# Patient Record
Sex: MALE | Race: WHITE | NOT HISPANIC OR LATINO | Employment: UNEMPLOYED | URBAN - METROPOLITAN AREA
[De-identification: names, ages, dates, MRNs, and addresses within clinical notes are randomized per-mention and may not be internally consistent; named-entity substitution may affect disease eponyms.]

---

## 2017-08-03 ENCOUNTER — APPOINTMENT (OUTPATIENT)
Dept: LAB | Facility: MEDICAL CENTER | Age: 62
End: 2017-08-03

## 2017-08-04 ENCOUNTER — HOSPITAL ENCOUNTER (OUTPATIENT)
Dept: LAB | Facility: MEDICAL CENTER | Age: 62
End: 2017-08-04
Attending: OTOLARYNGOLOGY
Payer: COMMERCIAL

## 2017-08-04 PROCEDURE — 88271 CYTOGENETICS DNA PROBE: CPT | Mod: 91

## 2017-08-04 PROCEDURE — 88184 FLOWCYTOMETRY/ TC 1 MARKER: CPT

## 2017-08-04 PROCEDURE — 81263 IGH VARI REGIONAL MUTATION: CPT

## 2017-08-04 PROCEDURE — 88185 FLOWCYTOMETRY/TC ADD-ON: CPT | Mod: 91

## 2017-08-04 PROCEDURE — 36415 COLL VENOUS BLD VENIPUNCTURE: CPT

## 2017-08-04 PROCEDURE — 369999 HCHG MISC LAB CHARGE

## 2017-08-04 PROCEDURE — 88291 CYTO/MOLECULAR REPORT: CPT

## 2017-08-04 PROCEDURE — 88275 CYTOGENETICS 100-300: CPT | Mod: 91

## 2017-08-05 LAB
ACUTE LEUKEMIA MARKERS SPEC-IMP: NORMAL
CD10 CELLS NFR SPEC: <1 %
CD13 CELLS NFR SPEC: <1 %
CD16 CELLS NFR SPEC: 1 %
CD19 CELLS NFR SPEC: 88 %
CD2 CELLS NFR SPEC: 8 %
CD20 CELLS NFR SPEC: 92 %
CD200 LEUKLYMP PHENO Q5833: 5 %
CD23 CELLS NFR SPEC: 31 %
CD3 CELLS NFR SPEC: 6 %
CD34 CELLS NFR SPEC: <1 %
CD38 CELLS NFR SPEC: <1 %
CD4 LEUKLYMP PHENO Q5783: 2 %
CD45 CELLS NFR SPEC: NORMAL %
CD5 CELLS NFR SPEC: 96 %
CD7 CELLS NFR SPEC: 5 %
CD8 LEUKLYMP PHENO Q5786: 3 %
EVENTS COUNTED SPEC: 18 MARKERS
LYMPHS KAPPA/LYMPH NFR SPEC: 86 %
LYMPHS LAMBDA/LYMPH NFR SPEC: 1 %
SOURCE 9121: NORMAL

## 2017-08-10 LAB — TEST NAME 95000: NORMAL

## 2017-08-11 LAB — TEST NAME 95000: NORMAL

## 2017-08-21 ENCOUNTER — HOSPITAL ENCOUNTER (OUTPATIENT)
Dept: LAB | Facility: MEDICAL CENTER | Age: 62
End: 2017-08-21
Attending: OTOLARYNGOLOGY
Payer: COMMERCIAL

## 2017-08-21 PROCEDURE — 88185 FLOWCYTOMETRY/TC ADD-ON: CPT

## 2017-08-21 PROCEDURE — 88184 FLOWCYTOMETRY/ TC 1 MARKER: CPT

## 2017-08-21 PROCEDURE — 36415 COLL VENOUS BLD VENIPUNCTURE: CPT

## 2017-08-23 ENCOUNTER — HOSPITAL ENCOUNTER (OUTPATIENT)
Dept: LAB | Facility: MEDICAL CENTER | Age: 62
End: 2017-08-23
Attending: INTERNAL MEDICINE

## 2017-08-23 PROCEDURE — 36415 COLL VENOUS BLD VENIPUNCTURE: CPT

## 2017-08-23 PROCEDURE — 88275 CYTOGENETICS 100-300: CPT | Mod: 91

## 2017-08-23 PROCEDURE — 88271 CYTOGENETICS DNA PROBE: CPT

## 2017-08-23 PROCEDURE — 88291 CYTO/MOLECULAR REPORT: CPT

## 2017-08-25 LAB — TEST NAME 95000: NORMAL

## 2017-09-01 LAB — TEST NAME 95000: NORMAL

## 2017-09-07 ENCOUNTER — TELEPHONE (OUTPATIENT)
Dept: HEMATOLOGY ONCOLOGY | Facility: MEDICAL CENTER | Age: 62
End: 2017-09-07

## 2017-09-07 NOTE — TELEPHONE ENCOUNTER
Spoke to Ana, patient's son, per him his [facility] provider has been in contact with Dr. Garzon and they were told to contact us to schedule an appointment.  His [facility] provider is Dr. Herrera, we do have labs in media from [facility].  Pt is here on a 3 mo visa which expires approx end of October, so needs to be seen ASAP.  Will reach out to [facility] and verify if I can overbook the pt.

## 2017-09-11 NOTE — TELEPHONE ENCOUNTER
Called and spoke to Ana and scheduled an appointment to see Dr. Garzon.  Received additional records and labs, scanned into media

## 2017-09-11 NOTE — TELEPHONE ENCOUNTER
Flor from Dr Mackey's office at [facility] called- Dr. Mackey had spoken to [facility] concerning an ASAP appt.  Pt may need an infusion.

## 2017-09-12 ENCOUNTER — HOSPITAL ENCOUNTER (OUTPATIENT)
Facility: MEDICAL CENTER | Age: 62
End: 2017-09-12
Attending: INTERNAL MEDICINE
Payer: COMMERCIAL

## 2017-09-12 ENCOUNTER — TELEPHONE (OUTPATIENT)
Dept: HEMATOLOGY ONCOLOGY | Facility: MEDICAL CENTER | Age: 62
End: 2017-09-12

## 2017-09-12 ENCOUNTER — NON-PROVIDER VISIT (OUTPATIENT)
Dept: HEMATOLOGY ONCOLOGY | Facility: MEDICAL CENTER | Age: 62
End: 2017-09-12

## 2017-09-12 ENCOUNTER — OFFICE VISIT (OUTPATIENT)
Dept: HEMATOLOGY ONCOLOGY | Facility: MEDICAL CENTER | Age: 62
End: 2017-09-12

## 2017-09-12 VITALS
RESPIRATION RATE: 16 BRPM | WEIGHT: 160 LBS | SYSTOLIC BLOOD PRESSURE: 120 MMHG | HEIGHT: 65 IN | BODY MASS INDEX: 26.66 KG/M2 | OXYGEN SATURATION: 96 % | TEMPERATURE: 97.7 F | DIASTOLIC BLOOD PRESSURE: 72 MMHG | HEART RATE: 93 BPM

## 2017-09-12 VITALS
BODY MASS INDEX: 26.73 KG/M2 | DIASTOLIC BLOOD PRESSURE: 72 MMHG | WEIGHT: 160.4 LBS | HEIGHT: 65 IN | HEART RATE: 93 BPM | OXYGEN SATURATION: 96 % | TEMPERATURE: 97.7 F | SYSTOLIC BLOOD PRESSURE: 120 MMHG

## 2017-09-12 DIAGNOSIS — C91.10 CLL (CHRONIC LYMPHOCYTIC LEUKEMIA) (HCC): Primary | ICD-10-CM

## 2017-09-12 DIAGNOSIS — C91.10 CLL (CHRONIC LYMPHOCYTIC LEUKEMIA) (HCC): ICD-10-CM

## 2017-09-12 LAB
ALBUMIN SERPL BCP-MCNC: 4.6 G/DL (ref 3.2–4.9)
ALBUMIN/GLOB SERPL: 1.4 G/DL
ALP SERPL-CCNC: 311 U/L (ref 30–99)
ALT SERPL-CCNC: 16 U/L (ref 2–50)
ANION GAP SERPL CALC-SCNC: 7 MMOL/L (ref 0–11.9)
ANISOCYTOSIS BLD QL SMEAR: NORMAL
AST SERPL-CCNC: 27 U/L (ref 12–45)
BASOPHILS # BLD AUTO: 0 % (ref 0–1.8)
BASOPHILS # BLD: 0 K/UL (ref 0–0.12)
BILIRUB SERPL-MCNC: 0.4 MG/DL (ref 0.1–1.5)
BUN SERPL-MCNC: 22 MG/DL (ref 8–22)
CALCIUM SERPL-MCNC: 9.9 MG/DL (ref 8.5–10.5)
CHLORIDE SERPL-SCNC: 100 MMOL/L (ref 96–112)
CO2 SERPL-SCNC: 25 MMOL/L (ref 20–33)
CREAT SERPL-MCNC: 0.96 MG/DL (ref 0.5–1.4)
EOSINOPHIL # BLD AUTO: 0 K/UL (ref 0–0.51)
EOSINOPHIL NFR BLD: 0 % (ref 0–6.9)
ERYTHROCYTE [DISTWIDTH] IN BLOOD BY AUTOMATED COUNT: 55.5 FL (ref 35.9–50)
FERRITIN SERPL-MCNC: 112.6 NG/ML (ref 22–322)
FOLATE SERPL-MCNC: 16.3 NG/ML
GFR SERPL CREATININE-BSD FRML MDRD: >60 ML/MIN/1.73 M 2
GLOBULIN SER CALC-MCNC: 3.3 G/DL (ref 1.9–3.5)
GLUCOSE SERPL-MCNC: 149 MG/DL (ref 65–99)
HCT VFR BLD AUTO: 38.6 % (ref 42–52)
HGB BLD-MCNC: 10.4 G/DL (ref 14–18)
IRON SATN MFR SERPL: 12 % (ref 15–55)
IRON SERPL-MCNC: 42 UG/DL (ref 50–180)
LDH SERPL-CCNC: 378 U/L (ref 107–266)
LYMPHOCYTES # BLD AUTO: 394.72 K/UL (ref 1–4.8)
LYMPHOCYTES NFR BLD: 89 % (ref 22–41)
MANUAL DIFF BLD: ABNORMAL
MCH RBC QN AUTO: 26.9 PG (ref 27–33)
MCHC RBC AUTO-ENTMCNC: 26.9 G/DL (ref 33.7–35.3)
MCV RBC AUTO: 99.7 FL (ref 81.4–97.8)
MONOCYTES # BLD AUTO: 0 K/UL (ref 0–0.85)
MONOCYTES NFR BLD AUTO: 0 % (ref 0–13.4)
NEUTROPHILS # BLD AUTO: 0 K/UL (ref 1.82–7.42)
NEUTROPHILS NFR BLD: 0 % (ref 44–72)
NEUTS BAND NFR BLD MANUAL: 1 % (ref 0–10)
PATH REV: NORMAL
PATH REV: NORMAL
PLATELET # BLD AUTO: 117 K/UL (ref 164–446)
PLATELET BLD QL SMEAR: NORMAL
PMV BLD AUTO: 9.4 FL (ref 9–12.9)
POTASSIUM SERPL-SCNC: 5.9 MMOL/L (ref 3.6–5.5)
PROT SERPL-MCNC: 7.9 G/DL (ref 6–8.2)
RBC # BLD AUTO: 3.87 M/UL (ref 4.7–6.1)
RBC BLD AUTO: PRESENT
SMUDGE CELLS BLD QL SMEAR: NORMAL
SODIUM SERPL-SCNC: 132 MMOL/L (ref 135–145)
TIBC SERPL-MCNC: 343 UG/DL (ref 250–450)
URATE SERPL-MCNC: 6 MG/DL (ref 2.5–8.3)
VIT B12 SERPL-MCNC: 442 PG/ML (ref 211–911)
WBC # BLD AUTO: >440 K/UL (ref 4.8–10.8)
WBC OTHER NFR BLD MANUAL: 10 %

## 2017-09-12 PROCEDURE — 99205 OFFICE O/P NEW HI 60 MIN: CPT | Performed by: INTERNAL MEDICINE

## 2017-09-12 PROCEDURE — 82607 VITAMIN B-12: CPT

## 2017-09-12 PROCEDURE — 82232 ASSAY OF BETA-2 PROTEIN: CPT

## 2017-09-12 PROCEDURE — 83550 IRON BINDING TEST: CPT

## 2017-09-12 PROCEDURE — 83540 ASSAY OF IRON: CPT

## 2017-09-12 PROCEDURE — 85027 COMPLETE CBC AUTOMATED: CPT

## 2017-09-12 PROCEDURE — 85007 BL SMEAR W/DIFF WBC COUNT: CPT

## 2017-09-12 PROCEDURE — 80500 HCHG CLINICAL PATH CONSULT-LIMITED: CPT

## 2017-09-12 PROCEDURE — 83615 LACTATE (LD) (LDH) ENZYME: CPT

## 2017-09-12 PROCEDURE — 82728 ASSAY OF FERRITIN: CPT

## 2017-09-12 PROCEDURE — 82746 ASSAY OF FOLIC ACID SERUM: CPT

## 2017-09-12 PROCEDURE — 84550 ASSAY OF BLOOD/URIC ACID: CPT

## 2017-09-12 PROCEDURE — 80053 COMPREHEN METABOLIC PANEL: CPT

## 2017-09-12 RX ORDER — SODIUM CHLORIDE 9 MG/ML
INJECTION, SOLUTION INTRAVENOUS CONTINUOUS
Status: CANCELLED | OUTPATIENT
Start: 2017-09-22

## 2017-09-12 RX ORDER — LIDOCAINE HYDROCHLORIDE 10 MG/ML
0.5 INJECTION, SOLUTION INFILTRATION; PERINEURAL SEE ADMIN INSTRUCTIONS
Status: CANCELLED | OUTPATIENT
Start: 2017-10-10

## 2017-09-12 RX ORDER — ONDANSETRON 2 MG/ML
4 INJECTION INTRAMUSCULAR; INTRAVENOUS
Status: CANCELLED | OUTPATIENT
Start: 2017-09-22

## 2017-09-12 RX ORDER — SODIUM CHLORIDE 9 MG/ML
1000 INJECTION, SOLUTION INTRAVENOUS CONTINUOUS
Status: CANCELLED | OUTPATIENT
Start: 2017-09-22

## 2017-09-12 RX ORDER — ONDANSETRON 8 MG/1
8 TABLET, ORALLY DISINTEGRATING ORAL
Status: CANCELLED | OUTPATIENT
Start: 2017-10-10

## 2017-09-12 RX ORDER — PROCHLORPERAZINE MALEATE 10 MG
10 TABLET ORAL EVERY 6 HOURS PRN
Status: CANCELLED | OUTPATIENT
Start: 2017-09-29

## 2017-09-12 RX ORDER — ONDANSETRON 8 MG/1
8 TABLET, ORALLY DISINTEGRATING ORAL
Status: CANCELLED | OUTPATIENT
Start: 2017-09-23

## 2017-09-12 RX ORDER — ONDANSETRON 8 MG/1
8 TABLET, ORALLY DISINTEGRATING ORAL
Status: CANCELLED | OUTPATIENT
Start: 2017-09-22

## 2017-09-12 RX ORDER — ONDANSETRON 2 MG/ML
4 INJECTION INTRAMUSCULAR; INTRAVENOUS
Status: CANCELLED | OUTPATIENT
Start: 2017-10-10

## 2017-09-12 RX ORDER — ALLOPURINOL 100 MG/1
100 TABLET ORAL 2 TIMES DAILY
Qty: 60 TAB | Refills: 0 | Status: SHIPPED | OUTPATIENT
Start: 2017-09-12 | End: 2017-09-12 | Stop reason: SDUPTHER

## 2017-09-12 RX ORDER — ALLOPURINOL 100 MG/1
300 TABLET ORAL 2 TIMES DAILY
Qty: 30 TAB | Refills: 0
Start: 2017-09-12 | End: 2017-10-12

## 2017-09-12 RX ORDER — ACETAMINOPHEN 500 MG
1000 TABLET ORAL ONCE
Status: CANCELLED | OUTPATIENT
Start: 2017-09-29 | End: 2017-09-29

## 2017-09-12 RX ORDER — MEPERIDINE HYDROCHLORIDE 25 MG/ML
12.5 INJECTION INTRAMUSCULAR; INTRAVENOUS; SUBCUTANEOUS PRN
Status: CANCELLED | OUTPATIENT
Start: 2017-09-23

## 2017-09-12 RX ORDER — ONDANSETRON 8 MG/1
8 TABLET, ORALLY DISINTEGRATING ORAL
Status: CANCELLED | OUTPATIENT
Start: 2017-09-29

## 2017-09-12 RX ORDER — ONDANSETRON 2 MG/ML
4 INJECTION INTRAMUSCULAR; INTRAVENOUS
Status: CANCELLED | OUTPATIENT
Start: 2017-09-23

## 2017-09-12 RX ORDER — ONDANSETRON 2 MG/ML
4 INJECTION INTRAMUSCULAR; INTRAVENOUS
Status: CANCELLED | OUTPATIENT
Start: 2017-09-29

## 2017-09-12 RX ORDER — ACETAMINOPHEN 500 MG
1000 TABLET ORAL ONCE
Status: CANCELLED | OUTPATIENT
Start: 2017-10-10 | End: 2017-10-06

## 2017-09-12 RX ORDER — PROCHLORPERAZINE MALEATE 10 MG
10 TABLET ORAL EVERY 6 HOURS PRN
Status: CANCELLED | OUTPATIENT
Start: 2017-10-10

## 2017-09-12 RX ORDER — ACYCLOVIR 400 MG/1
400 TABLET ORAL 2 TIMES DAILY
Qty: 30 TAB | Refills: 5 | Status: SHIPPED | OUTPATIENT
Start: 2017-09-12 | End: 2018-01-15 | Stop reason: SDUPTHER

## 2017-09-12 RX ORDER — LIDOCAINE HYDROCHLORIDE 10 MG/ML
0.5 INJECTION, SOLUTION INFILTRATION; PERINEURAL SEE ADMIN INSTRUCTIONS
Status: CANCELLED | OUTPATIENT
Start: 2017-09-22

## 2017-09-12 RX ORDER — ACETAMINOPHEN 500 MG
1000 TABLET ORAL ONCE
Status: CANCELLED | OUTPATIENT
Start: 2017-09-22 | End: 2017-09-22

## 2017-09-12 RX ORDER — LIDOCAINE HYDROCHLORIDE 10 MG/ML
0.5 INJECTION, SOLUTION INFILTRATION; PERINEURAL SEE ADMIN INSTRUCTIONS
Status: CANCELLED | OUTPATIENT
Start: 2017-09-23

## 2017-09-12 RX ORDER — PROCHLORPERAZINE MALEATE 10 MG
10 TABLET ORAL EVERY 6 HOURS PRN
Status: CANCELLED | OUTPATIENT
Start: 2017-09-23

## 2017-09-12 RX ORDER — MEPERIDINE HYDROCHLORIDE 25 MG/ML
12.5 INJECTION INTRAMUSCULAR; INTRAVENOUS; SUBCUTANEOUS PRN
Status: CANCELLED | OUTPATIENT
Start: 2017-09-22

## 2017-09-12 RX ORDER — PROCHLORPERAZINE MALEATE 10 MG
10 TABLET ORAL EVERY 6 HOURS PRN
Status: CANCELLED | OUTPATIENT
Start: 2017-09-22

## 2017-09-12 RX ORDER — LIDOCAINE HYDROCHLORIDE 10 MG/ML
0.5 INJECTION, SOLUTION INFILTRATION; PERINEURAL SEE ADMIN INSTRUCTIONS
Status: CANCELLED | OUTPATIENT
Start: 2017-09-29

## 2017-09-12 RX ORDER — MEPERIDINE HYDROCHLORIDE 25 MG/ML
12.5 INJECTION INTRAMUSCULAR; INTRAVENOUS; SUBCUTANEOUS PRN
Status: CANCELLED | OUTPATIENT
Start: 2017-10-10

## 2017-09-12 RX ORDER — MEPERIDINE HYDROCHLORIDE 25 MG/ML
12.5 INJECTION INTRAMUSCULAR; INTRAVENOUS; SUBCUTANEOUS PRN
Status: CANCELLED | OUTPATIENT
Start: 2017-09-29

## 2017-09-12 RX ORDER — SODIUM CHLORIDE 9 MG/ML
INJECTION, SOLUTION INTRAVENOUS CONTINUOUS
Status: CANCELLED | OUTPATIENT
Start: 2017-09-29

## 2017-09-12 RX ORDER — DEXAMETHASONE 4 MG/1
40 TABLET ORAL
Qty: 20 TAB | Refills: 0 | Status: SHIPPED | OUTPATIENT
Start: 2017-09-14 | End: 2017-09-22

## 2017-09-12 RX ORDER — SODIUM CHLORIDE 9 MG/ML
INJECTION, SOLUTION INTRAVENOUS CONTINUOUS
Status: CANCELLED | OUTPATIENT
Start: 2017-10-06

## 2017-09-12 RX ORDER — ACETAMINOPHEN 500 MG
1000 TABLET ORAL ONCE
Status: CANCELLED | OUTPATIENT
Start: 2017-09-23 | End: 2017-09-23

## 2017-09-12 RX ORDER — SODIUM CHLORIDE 9 MG/ML
INJECTION, SOLUTION INTRAVENOUS CONTINUOUS
Status: CANCELLED | OUTPATIENT
Start: 2017-09-23

## 2017-09-12 ASSESSMENT — PAIN SCALES - GENERAL
PAINLEVEL: NO PAIN
PAINLEVEL: NO PAIN

## 2017-09-12 NOTE — PROGRESS NOTES
Consult Note: Oncology    Date of consultation: 9/12/2017     Referring provider: The patient is here by the kind referral of No ref. provider found    Reason for consultation:   CC: Chronic lymphocytic leukemia     History of presenting illness:   February 28, 2013 WBC 7.8  March 4, 2016. WBC 60  March 16, 2017 WBC 42.4  May 22, 2017   July 21, 2017 patient was seen by oncologist Dr. Mendez Mariee at [facility] hematology clinic. He said that he has had elevated white cell count for a long time and has been in watch and wait pattern. He was recommended treatment based on final FISH results for B cell lymphoproliferative disorder.  July 31, 2017 . Absolute lymphocyte count 355  August 4, 2017. 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 13/p53 mutation. CD5 positive, CD19 positive CD20 dim partial 23 positive and Kappa +, no expression of , CD10, CD20 8.  August 28, 2017 . Absolute lymphocyte count 401. Patient was seen in follow-up at [facility]      All relevant medical records available in [facility] were reviewed and are summarized above.    Amanuel Zamora  is a 62 y.o. year old male who is visiting family in the [place] was seen in clinic at [facility] for leukocytosis. Workup showed he has CLL with 17 P deletion. The patient was found to have a rapid doubling time and in combination with 17 P deletion he was recommended to start therapy. However he does not have health insurance and is being out-of-pocket. He also has limited time in the [place] and was not able to start this treatment at [facility]. The patient was referred to for [facility] cancer centre for insurance reasons and is here to start treatment.      CLL  Location, blood   Severity, stage III  Timing, constant  Modifying factors, none   Quality, lymphocytic  Duration, one half year  Context, discovered on routine labs  Associated factors, splenomegaly and  "weight loss      Past Medical History:    Past Medical History:   Diagnosis Date   • Diabetes mellitus (CMS-[facility])          Allergies:  Review of patient's allergies indicates not on file.    Medications:    Current Outpatient Prescriptions   Medication Sig Dispense Refill   • allopurinol (ZYLOPRIM) 100 MG Tab Take 1 Tab by mouth 2 times a day for 30 days. 60 Tab 0   • [START ON 9/14/2017] dexamethasone (DECADRON) 4 MG Tab Take 10 Tabs by mouth every thursday for 2 doses. 20 Tab 0   • acyclovir (ZOVIRAX) 400 MG tablet Take 1 Tab by mouth 2 times a day. 30 Tab 5     No current facility-administered medications for this visit.        Social History:     Social History     Social History   • Marital status:      Spouse name: N/A   • Number of children: N/A   • Years of education: N/A     Occupational History   • retired       Social History Main Topics   • Smoking status: Never Smoker   • Smokeless tobacco: Never Used   • Alcohol use No   • Drug use: No   • Sexual activity: Yes     Partners: Female     Other Topics Concern   • Not on file     Social History Narrative   • No narrative on file       Family History:     Family History   Problem Relation Age of Onset   • No Known Problems Mother    • No Known Problems Father        Review of Systems:  Constitutional: No fever, chills, weight loss, malaise/fatigue.      All other review of systems are negative except what was mentioned above in the HPI.      Physical Exam:  Vitals:   /72   Pulse 93   Temp 36.5 °C (97.7 °F)   Ht 1.65 m (5' 4.96\")   Wt 72.8 kg (160 lb 6.4 oz)   SpO2 96%   BMI 26.72 kg/m²     ECOG performance status is 1 [Restricted in physically strenuous activity but ambulatory and able to carry out work of a light or sedentary nature, e.g., light house work, office work]  General: Not in acute distress, emaciated with temporal wasting  HEENT: No pallor, icterus. Oropharynx clear.   Neck: Supple, no palpable masses.  Lymph nodes: No " palpable cervical, supraclavicular, axillary or inguinal lymphadenopathy.    CVS: regular rate and rhythm, no rubs or gallops  RESP: Clear to auscultate bilaterally, no wheezing or crackles.   ABD: Soft,  non distended, positive bowel sounds, palpable splenomegaly  EXT: No edema or cyanosis  CNS: Alert and oriented x3, No focal deficits.  Skin- No rash      Labs:   Results for JANICE BURNETTE (MRN [id number]) as of 9/12/2017 12:06   9/12/2017 10:25   WBC >440.0 (HH)   RBC 3.87 (L)   Hemoglobin 10.4 (L)   Hematocrit 38.6 (L)   MCV 99.7 (H)   MCH 26.9 (L)   MCHC 26.9 (L)   RDW 55.5 (H)   Platelet Count 117 (L)   MPV 9.4   Neutrophils-Polys 0.00 (L)   Neutrophils (Absolute) 0.00 (LL)   Bands-Stabs 1.00   Lymphocytes 89.00 (H)   Lymphs (Absolute) 394.72 (H)   Monocytes 0.00   Monos (Absolute) 0.00   Eosinophils 0.00   Eos (Absolute) 0.00   Basophils 0.00   Baso (Absolute) 0.00   Other 10.00   Manual Diff Status PERFORMED       Pathology:  Results for JANICE BURNETTE (MRN [id number]) as of 9/12/2017 12:06   Ref. Range 8/4/2017 12:00   Number of Markers Latest Units: markers 18   % CD2, Leuk/Lymph Phenotype Latest Units: % 8   % CD3, Leuk/Lymph Phenotype Latest Units: % 6   % CD4, Leuk/Lymph Phenotype Latest Units: % 2   % CD5, Leuk/Lymph Phenotype Latest Units: % 96   % CD7, Leuk/Lymph Phenotype Latest Units: % 5   % CD8, Leuk/Lymph Phenotype Latest Units: % 3   % CD16, Leuk/Lymph Phenotype Latest Units: % 1   % CD10, Leuk/Lymph Phenotype Latest Units: % <1   % CD19, Leuk/Lymph Phenotype Latest Units: % 88   % CD20, Leuk/Lymph Phenotype Latest Units: % 92   % CD23, Leuk/Lymph Phenotype Latest Units: % 31   % Kappa, Leuk/Lymph Phenotype Latest Units: % 86   % Lambda, Leuk/Lymph Phenotype Latest Units: % 1   % CD34, Leuk/Lymph Phenotype Latest Units: % <1   % CD13, Leuk/Lymph Phenotype Latest Units: % <1   % CD38, Leuk/Lymph Phenotype Latest Units: % <1   % , Leuk/Lymph Phenotype Latest Units: % 5       Imaging:    Assessment  and Plan:  -CLL  -Zamudio stage II with splenomegaly  -17p deletion/TP53 mutation  -This is a very unusual situation with patient is here in the [place] until October 10.  -Patient needs to start treatment for his B-cell lymphoproliferative disorder.  -Patient is poor risk because of 17p deletion  -Ideally the patient needs to be started on first-line chemoimmunotherapy followed by maintenance therapy or started on Ibrutininb. However the patient's social situation is that he is paying out of pocket for everything and cannot afford expensive medical care. He is also leaving the [place] in about 6 weeks.  -The patient and family are requesting that treatment be started now which he can continue later back home or in [place].  -The patient was started on pulse dose steroids today, acyclovir for viral prophylaxis.  -The patient is extremely high risk for tumor lysis syndrome.  -Plan to start cycle 1 of Obintuzumab and Chlorambucil next week. Check viral hepatitis profile    -Tumor lysis syndrome  -We'll check a CBC and CMP today  -Check uric acid  -Start the patient on allopurinol  -The patient will have daily labs drawn. If there is any indication of tumor lysis syndrome he may need to be admitted for inpatient management.  -We'll prophylax with rasburicase with his therapy    -Diabetes mellitus  -Patient stated he has not been taking his medications.  -Encouraged the patient to start taking his medications as prescribed  -Recommend patient is seen by her primary care physician as steroids will increase his blood sugar.    -All medical records available in [facility] were reviewed and are summarized above.  -All labs and/or imaging studies mentioned in the note above were reviewed with and explained to the patient as a pertain to medical decision making.      He agreed and verbalized his agreement and understanding with the current plan.  I answered all questions and concerns he has at this time.    I spent 60  minutes in face-to-face time with patient, of which >50 % was spent in counseling and coordination of care surrounding the above issues as discussed in the assessment and plan.        Please note that this dictation was created using voice recognition software. I have made every reasonable attempt to correct obvious errors, but I expect that there are errors of grammar and possibly content that I did not discover before finalizing the note.      SIGNATURES:  Shaila Garzon

## 2017-09-12 NOTE — PROGRESS NOTES
Amanuel Zamora is a 62 y.o. male here for a non-provider visit for: Lab Draws  on 9/12/2017 at 11:59 AM    Procedure Performed: Venipuncture     Anatomical site: Right Antecubital Area (AC)    Equipment used: 21g    Labs drawn: CBC w/diff, CMP, LDH and Folate, Uric Acid, Vitamin B12, Ferritin, Iron/Total Iron Bind, Differential Manual, Beta-2 Microglobulin, Hemoglobind A1C    Ordering Provider: Dr. [facility]    Ravi By: Cathy Moreira, Med Ass't  1 re-stick on right ac with 21g needle   No Complications

## 2017-09-13 ENCOUNTER — HOSPITAL ENCOUNTER (OUTPATIENT)
Dept: LAB | Facility: MEDICAL CENTER | Age: 62
End: 2017-09-13
Attending: INTERNAL MEDICINE
Payer: COMMERCIAL

## 2017-09-13 DIAGNOSIS — C91.10 CLL (CHRONIC LYMPHOCYTIC LEUKEMIA) (HCC): ICD-10-CM

## 2017-09-13 LAB
ANISOCYTOSIS BLD QL SMEAR: ABNORMAL
B2 MICROGLOB SERPL-MCNC: 4.7 MG/L (ref 1.1–2.4)
BASOPHILS # BLD AUTO: 0 % (ref 0–1.8)
BASOPHILS # BLD: 0 K/UL (ref 0–0.12)
EOSINOPHIL # BLD AUTO: 0 K/UL (ref 0–0.51)
EOSINOPHIL NFR BLD: 0 % (ref 0–6.9)
ERYTHROCYTE [DISTWIDTH] IN BLOOD BY AUTOMATED COUNT: 56.7 FL (ref 35.9–50)
GFR SERPL CREATININE-BSD FRML MDRD: >60 ML/MIN/1.73 M 2
HCT VFR BLD AUTO: 38.5 % (ref 42–52)
HGB BLD-MCNC: 10 G/DL (ref 14–18)
HYPOCHROMIA BLD QL SMEAR: ABNORMAL
LYMPHOCYTES # BLD AUTO: 384.71 K/UL (ref 1–4.8)
LYMPHOCYTES NFR BLD: 87 % (ref 22–41)
MANUAL DIFF BLD: NORMAL
MCH RBC QN AUTO: 26.1 PG (ref 27–33)
MCHC RBC AUTO-ENTMCNC: 26 G/DL (ref 33.7–35.3)
MCV RBC AUTO: 100.5 FL (ref 81.4–97.8)
MICROCYTES BLD QL SMEAR: ABNORMAL
MONOCYTES # BLD AUTO: 28.74 K/UL (ref 0–0.85)
MONOCYTES NFR BLD AUTO: 6.5 % (ref 0–13.4)
MORPHOLOGY BLD-IMP: NORMAL
MYELOCYTES NFR BLD MANUAL: 0.8 %
NEUTROPHILS # BLD AUTO: 14.59 K/UL (ref 1.82–7.42)
NEUTROPHILS NFR BLD: 3.3 % (ref 44–72)
NRBC # BLD AUTO: 0 K/UL
NRBC BLD AUTO-RTO: 0 /100 WBC
OVALOCYTES BLD QL SMEAR: NORMAL
PLATELET # BLD AUTO: 117 K/UL (ref 164–446)
PLATELET BLD QL SMEAR: NORMAL
PMV BLD AUTO: 9.5 FL (ref 9–12.9)
POIKILOCYTOSIS BLD QL SMEAR: NORMAL
PROMYELOCYTES NFR BLD MANUAL: 2.4 %
RBC # BLD AUTO: 3.83 M/UL (ref 4.7–6.1)
RBC BLD AUTO: PRESENT
WBC # BLD AUTO: >440 K/UL (ref 4.8–10.8)

## 2017-09-13 PROCEDURE — 80074 ACUTE HEPATITIS PANEL: CPT

## 2017-09-13 PROCEDURE — 85027 COMPLETE CBC AUTOMATED: CPT

## 2017-09-13 PROCEDURE — 84550 ASSAY OF BLOOD/URIC ACID: CPT

## 2017-09-13 PROCEDURE — 80053 COMPREHEN METABOLIC PANEL: CPT

## 2017-09-13 PROCEDURE — 85007 BL SMEAR W/DIFF WBC COUNT: CPT

## 2017-09-13 PROCEDURE — 36415 COLL VENOUS BLD VENIPUNCTURE: CPT

## 2017-09-13 PROCEDURE — 82955 ASSAY OF G6PD ENZYME: CPT

## 2017-09-14 ENCOUNTER — HOSPITAL ENCOUNTER (OUTPATIENT)
Dept: LAB | Facility: MEDICAL CENTER | Age: 62
End: 2017-09-14
Attending: INTERNAL MEDICINE

## 2017-09-14 DIAGNOSIS — C91.10 CLL (CHRONIC LYMPHOCYTIC LEUKEMIA) (HCC): ICD-10-CM

## 2017-09-14 LAB
ALBUMIN SERPL BCP-MCNC: 4 G/DL (ref 3.2–4.9)
ALBUMIN SERPL BCP-MCNC: 4.3 G/DL (ref 3.2–4.9)
ALBUMIN/GLOB SERPL: 1.3 G/DL
ALBUMIN/GLOB SERPL: 1.4 G/DL
ALP SERPL-CCNC: 217 U/L (ref 30–99)
ALP SERPL-CCNC: 346 U/L (ref 30–99)
ALT SERPL-CCNC: 15 U/L (ref 2–50)
ALT SERPL-CCNC: 16 U/L (ref 2–50)
ANION GAP SERPL CALC-SCNC: 10 MMOL/L (ref 0–11.9)
ANION GAP SERPL CALC-SCNC: 9 MMOL/L (ref 0–11.9)
ANISOCYTOSIS BLD QL SMEAR: ABNORMAL
AST SERPL-CCNC: 16 U/L (ref 12–45)
AST SERPL-CCNC: 23 U/L (ref 12–45)
BASOPHILS # BLD AUTO: 0 % (ref 0–1.8)
BASOPHILS # BLD: 0 K/UL (ref 0–0.12)
BILIRUB SERPL-MCNC: 0.4 MG/DL (ref 0.1–1.5)
BILIRUB SERPL-MCNC: 0.5 MG/DL (ref 0.1–1.5)
BUN SERPL-MCNC: 17 MG/DL (ref 8–22)
BUN SERPL-MCNC: 22 MG/DL (ref 8–22)
CALCIUM SERPL-MCNC: 9.6 MG/DL (ref 8.5–10.5)
CALCIUM SERPL-MCNC: 9.8 MG/DL (ref 8.5–10.5)
CHLORIDE SERPL-SCNC: 101 MMOL/L (ref 96–112)
CHLORIDE SERPL-SCNC: 104 MMOL/L (ref 96–112)
CO2 SERPL-SCNC: 24 MMOL/L (ref 20–33)
CO2 SERPL-SCNC: 25 MMOL/L (ref 20–33)
CREAT SERPL-MCNC: 0.86 MG/DL (ref 0.5–1.4)
CREAT SERPL-MCNC: 0.99 MG/DL (ref 0.5–1.4)
EOSINOPHIL # BLD AUTO: 0 K/UL (ref 0–0.51)
EOSINOPHIL NFR BLD: 0 % (ref 0–6.9)
GFR SERPL CREATININE-BSD FRML MDRD: >60 ML/MIN/1.73 M 2
GLOBULIN SER CALC-MCNC: 3 G/DL (ref 1.9–3.5)
GLOBULIN SER CALC-MCNC: 3.2 G/DL (ref 1.9–3.5)
GLUCOSE SERPL-MCNC: 228 MG/DL (ref 65–99)
GLUCOSE SERPL-MCNC: 74 MG/DL (ref 65–99)
HAV IGM SERPL QL IA: NEGATIVE
HBV CORE IGM SER QL: NEGATIVE
HBV SURFACE AG SER QL: NEGATIVE
HCT VFR BLD AUTO: 38.3 % (ref 42–52)
HCV AB SER QL: NEGATIVE
HGB BLD-MCNC: 10.2 G/DL (ref 14–18)
LYMPHOCYTES # BLD AUTO: 420.22 K/UL (ref 1–4.8)
LYMPHOCYTES NFR BLD: 92.6 % (ref 22–41)
MACROCYTES BLD QL SMEAR: ABNORMAL
MANUAL DIFF BLD: NORMAL
MCH RBC QN AUTO: 27.1 PG (ref 27–33)
MCHC RBC AUTO-ENTMCNC: 26.6 G/DL (ref 33.7–35.3)
MCV RBC AUTO: 101.6 FL (ref 81.4–97.8)
MONOCYTES # BLD AUTO: 18.61 K/UL (ref 0–0.85)
MONOCYTES NFR BLD AUTO: 4.1 % (ref 0–13.4)
MORPHOLOGY BLD-IMP: NORMAL
NEUTROPHILS # BLD AUTO: 14.98 K/UL (ref 1.82–7.42)
NEUTROPHILS NFR BLD: 3.3 % (ref 44–72)
NRBC # BLD AUTO: 0.13 K/UL
NRBC BLD AUTO-RTO: 0 /100 WBC
PLATELET # BLD AUTO: 123 K/UL (ref 164–446)
PLATELET BLD QL SMEAR: NORMAL
PMV BLD AUTO: 10 FL (ref 9–12.9)
POTASSIUM SERPL-SCNC: 4.4 MMOL/L (ref 3.6–5.5)
POTASSIUM SERPL-SCNC: 5.2 MMOL/L (ref 3.6–5.5)
PROT SERPL-MCNC: 7.2 G/DL (ref 6–8.2)
PROT SERPL-MCNC: 7.3 G/DL (ref 6–8.2)
RBC # BLD AUTO: 3.77 M/UL (ref 4.7–6.1)
RBC BLD AUTO: PRESENT
SMUDGE CELLS BLD QL SMEAR: NORMAL
SODIUM SERPL-SCNC: 135 MMOL/L (ref 135–145)
SODIUM SERPL-SCNC: 138 MMOL/L (ref 135–145)
URATE SERPL-MCNC: 3.8 MG/DL (ref 2.5–8.3)
URATE SERPL-MCNC: 4.8 MG/DL (ref 2.5–8.3)
WBC # BLD AUTO: >440 K/UL (ref 4.8–10.8)

## 2017-09-14 PROCEDURE — 80053 COMPREHEN METABOLIC PANEL: CPT

## 2017-09-14 PROCEDURE — 85007 BL SMEAR W/DIFF WBC COUNT: CPT

## 2017-09-14 PROCEDURE — 84550 ASSAY OF BLOOD/URIC ACID: CPT

## 2017-09-14 PROCEDURE — 36415 COLL VENOUS BLD VENIPUNCTURE: CPT

## 2017-09-14 PROCEDURE — 85027 COMPLETE CBC AUTOMATED: CPT

## 2017-09-15 ENCOUNTER — OFFICE VISIT (OUTPATIENT)
Dept: HEMATOLOGY ONCOLOGY | Facility: MEDICAL CENTER | Age: 62
End: 2017-09-15

## 2017-09-15 ENCOUNTER — HOSPITAL ENCOUNTER (OUTPATIENT)
Dept: LAB | Facility: MEDICAL CENTER | Age: 62
End: 2017-09-15
Attending: INTERNAL MEDICINE
Payer: COMMERCIAL

## 2017-09-15 ENCOUNTER — TELEPHONE (OUTPATIENT)
Dept: HEMATOLOGY ONCOLOGY | Facility: MEDICAL CENTER | Age: 62
End: 2017-09-15

## 2017-09-15 VITALS
SYSTOLIC BLOOD PRESSURE: 118 MMHG | WEIGHT: 135.25 LBS | BODY MASS INDEX: 23.09 KG/M2 | TEMPERATURE: 98.4 F | HEART RATE: 93 BPM | HEIGHT: 64 IN | DIASTOLIC BLOOD PRESSURE: 70 MMHG | RESPIRATION RATE: 14 BRPM | OXYGEN SATURATION: 96 %

## 2017-09-15 DIAGNOSIS — C91.10 CLL (CHRONIC LYMPHOCYTIC LEUKEMIA) (HCC): ICD-10-CM

## 2017-09-15 LAB
ALBUMIN SERPL BCP-MCNC: 4.2 G/DL (ref 3.2–4.9)
ALBUMIN/GLOB SERPL: 2.2 G/DL
ALP SERPL-CCNC: 713 U/L (ref 30–99)
ALT SERPL-CCNC: 14 U/L (ref 2–50)
ANION GAP SERPL CALC-SCNC: 9 MMOL/L (ref 0–11.9)
ANISOCYTOSIS BLD QL SMEAR: ABNORMAL
AST SERPL-CCNC: 26 U/L (ref 12–45)
BASOPHILS # BLD AUTO: 0 % (ref 0–1.8)
BASOPHILS # BLD: 0 K/UL (ref 0–0.12)
BILIRUB SERPL-MCNC: 0.4 MG/DL (ref 0.1–1.5)
BUN SERPL-MCNC: 23 MG/DL (ref 8–22)
BURR CELLS BLD QL SMEAR: NORMAL
CALCIUM SERPL-MCNC: 9.2 MG/DL (ref 8.5–10.5)
CHLORIDE SERPL-SCNC: 100 MMOL/L (ref 96–112)
CO2 SERPL-SCNC: 23 MMOL/L (ref 20–33)
CREAT SERPL-MCNC: 1.03 MG/DL (ref 0.5–1.4)
EOSINOPHIL # BLD AUTO: 0 K/UL (ref 0–0.51)
EOSINOPHIL NFR BLD: 0 % (ref 0–6.9)
ERYTHROCYTE [DISTWIDTH] IN BLOOD BY AUTOMATED COUNT: 60.7 FL (ref 35.9–50)
G6PD RBC-CCNC: 19.7 U/G HB (ref 9.9–16.6)
GFR SERPL CREATININE-BSD FRML MDRD: >60 ML/MIN/1.73 M 2
GLOBULIN SER CALC-MCNC: 1.9 G/DL (ref 1.9–3.5)
GLUCOSE SERPL-MCNC: 115 MG/DL (ref 65–99)
HCT VFR BLD AUTO: 39.1 % (ref 42–52)
HGB BLD-MCNC: 10.1 G/DL (ref 14–18)
LYMPHOCYTES # BLD AUTO: 412.95 K/UL (ref 1–4.8)
LYMPHOCYTES NFR BLD: 95.7 % (ref 22–41)
MACROCYTES BLD QL SMEAR: ABNORMAL
MANUAL DIFF BLD: NORMAL
MCH RBC QN AUTO: 27.4 PG (ref 27–33)
MCHC RBC AUTO-ENTMCNC: 25.8 G/DL (ref 33.7–35.3)
MCV RBC AUTO: 106 FL (ref 81.4–97.8)
MONOCYTES # BLD AUTO: 0 K/UL (ref 0–0.85)
MONOCYTES NFR BLD AUTO: 0 % (ref 0–13.4)
MORPHOLOGY BLD-IMP: NORMAL
NEUTROPHILS # BLD AUTO: 18.55 K/UL (ref 1.82–7.42)
NEUTROPHILS NFR BLD: 4.3 % (ref 44–72)
NRBC # BLD AUTO: 0.11 K/UL
NRBC BLD AUTO-RTO: 0 /100 WBC
PLATELET # BLD AUTO: 129 K/UL (ref 164–446)
PLATELET BLD QL SMEAR: NORMAL
PMV BLD AUTO: 9.8 FL (ref 9–12.9)
POIKILOCYTOSIS BLD QL SMEAR: NORMAL
POTASSIUM SERPL-SCNC: 4.1 MMOL/L (ref 3.6–5.5)
PROT SERPL-MCNC: 6.1 G/DL (ref 6–8.2)
RBC # BLD AUTO: 3.69 M/UL (ref 4.7–6.1)
RBC BLD AUTO: PRESENT
SODIUM SERPL-SCNC: 132 MMOL/L (ref 135–145)
URATE SERPL-MCNC: 3.3 MG/DL (ref 2.5–8.3)
WBC # BLD AUTO: 431.5 K/UL (ref 4.8–10.8)

## 2017-09-15 PROCEDURE — 85007 BL SMEAR W/DIFF WBC COUNT: CPT

## 2017-09-15 PROCEDURE — 84550 ASSAY OF BLOOD/URIC ACID: CPT

## 2017-09-15 PROCEDURE — 80053 COMPREHEN METABOLIC PANEL: CPT

## 2017-09-15 PROCEDURE — 36415 COLL VENOUS BLD VENIPUNCTURE: CPT

## 2017-09-15 PROCEDURE — 99213 OFFICE O/P EST LOW 20 MIN: CPT | Performed by: INTERNAL MEDICINE

## 2017-09-15 PROCEDURE — 85027 COMPLETE CBC AUTOMATED: CPT

## 2017-09-15 RX ORDER — DEXAMETHASONE 1 MG
1 TABLET ORAL DAILY
Qty: 10 TAB | Refills: 0 | Status: SHIPPED | OUTPATIENT
Start: 2017-09-20 | End: 2017-09-19 | Stop reason: SDUPTHER

## 2017-09-15 ASSESSMENT — PAIN SCALES - GENERAL: PAINLEVEL: NO PAIN

## 2017-09-15 NOTE — PROGRESS NOTES
Consult Note: Oncology    Date of consultation: 9/15/2017     Referring provider: The patient is here by the kind referral of No ref. provider found    Reason for consultation:   CC: Chronic lymphocytic leukemia     History of presenting illness:   February 28, 2013 WBC 7.8  March 4, 2016. WBC 60  March 16, 2017 WBC 42.4  May 22, 2017   July 21, 2017 patient was seen by oncologist Dr. Mendez Mariee at [facility] hematology clinic. He said that he has had elevated white cell count for a long time and has been in watch and wait pattern. He was recommended treatment based on final FISH results for B cell lymphoproliferative disorder.  July 31, 2017 . Absolute lymphocyte count 355  August 4, 2017. 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 13/p53 mutation. CD5 positive, CD19 positive CD20 dim partial 23 positive and Kappa +, no expression of , CD10, CD20 8.  August 28, 2017 . Absolute lymphocyte count 401. Patient was seen in follow-up at [facility]      [facility] were [facility] and are summarized above.    Amanuel Zamora  is a 62 y.o. year old male who is visiting family in the [place] was seen in clinic at [facility] for leukocytosis. Workup showed he has CLL with 17 P deletion. The patient was found to have a rapid doubling time and in combination with 17 P deletion he was recommended to start therapy. However he does not have health insurance and is being out-of-pocket. He also has limited time in the [place] and was not able to start this treatment at [facility]. The patient was referred to for [facility] centre for insurance reasons and is here to start treatment.    Interval istory  Doing well without any acute complaints. Tolerating the dexamethasone without issues.    CLL  Location, blood   Severity, stage II  Timing, constant  Modifying factors, none   Quality,  "lymphocytic  Duration, one half year  Context, discovered on routine labs  Associated factors, splenomegaly and weight loss      Past Medical History:    Past Medical History:   Diagnosis Date   • Diabetes mellitus (CMS-[facility])          Allergies:  Review of patient's allergies indicates not on file.    Medications:    Current Outpatient Prescriptions   Medication Sig Dispense Refill   • [START ON 9/20/2017] dexamethasone (DECADRON) 1 MG Tab Take 1 Tab by mouth every day for 2 days. 10 Tab 0   • chlorambucil (LEUKERAN) 2 MG Tab Take 6 Tabs by mouth every 14 days. 12 Tab 0   • dexamethasone (DECADRON) 4 MG Tab Take 10 Tabs by mouth every thursday for 2 doses. 20 Tab 0   • allopurinol (ZYLOPRIM) 100 MG Tab Take 3 Tabs by mouth 2 times a day for 30 days. 30 Tab 0   • acyclovir (ZOVIRAX) 400 MG tablet Take 1 Tab by mouth 2 times a day. 30 Tab 5     No current facility-administered medications for this visit.        Social History:     Social History     Social History   • Marital status:      Spouse name: N/A   • Number of children: N/A   • Years of education: N/A     Occupational History   • retired       Social History Main Topics   • Smoking status: Never Smoker   • Smokeless tobacco: Never Used   • Alcohol use No   • Drug use: No   • Sexual activity: Yes     Partners: Female     Other Topics Concern   • Not on file     Social History Narrative   • No narrative on file       Family History:     Family History   Problem Relation Age of Onset   • No Known Problems Mother    • No Known Problems Father        Review of Systems:  Constitutional: No fever, chills, weight loss, malaise/fatigue.      All other review of systems are negative except what was mentioned above in the HPI.      Physical Exam:  Vitals:   /70   Pulse 93   Temp 36.9 °C (98.4 °F)   Resp 14   Ht 1.626 m (5' 4\")   Wt 61.4 kg (135 lb 4 oz)   SpO2 96%   BMI 23.22 kg/m²     ECOG performance status is 1 [Restricted in physically " strenuous activity but ambulatory and able to carry out work of a light or sedentary nature, e.g., light house work, office work]  General: Not in acute distress, emaciated with temporal wasting  HEENT: No pallor, icterus. Oropharynx clear.   Neck: Supple, no palpable masses.  Lymph nodes: No palpable cervical, supraclavicular, axillary or inguinal lymphadenopathy.    CVS: regular rate and rhythm, no rubs or gallops  RESP: Clear to auscultate bilaterally, no wheezing or crackles.   ABD: Soft,  non distended, positive bowel sounds, palpable splenomegaly  EXT: No edema or cyanosis  CNS: Alert and oriented x3, No focal deficits.  Skin- No rash      Labs:   Recent Labs      09/13/17   0957  09/14/17   1413   WBC  >440.0*  >440.0*   RBC  3.83*  3.77*   HEMOGLOBIN  10.0*  10.2*   HEMATOCRIT  38.5*  38.3*   MCV  100.5*  101.6*   MCH  26.1*  27.1   MCHC  26.0*  26.6*   RDW  56.7*   --    PLATELETCT  117*  123*   MPV  9.5  10.0         Imaging:    Assessment and Plan:  -CLL  -Zamudio stage II with splenomegaly  -17p deletion/TP53 mutation  -This is a very unusual situation with patient is here in the [place] until October 10.  -Patient needs to start treatment for his B-cell lymphoproliferative disorder.  -Patient is poor risk because of 17p deletion  -Ideally the patient needs to be started on first-line chemoimmunotherapy followed by maintenance therapy or started on Ibrutininb. However the patient's social situation is that he is paying out of pocket for everything and cannot afford expensive medical care. He is also leaving the [place] in about 6 weeks.  -The patient and family are requesting that treatment be started now which he can continue later back home or in [place].  -Acyclovir for viral prophylaxis.  -The patient is extremely high risk for tumor lysis syndrome.  -Patient received 40 mg of oral Decadron   -Start Decadron 20 mg by mouth daily starting Monday to Thursday  -Plan to start cycle 1 of  Obintuzumab and Chlorambucil on Friday.    -Tumor lysis syndrome  -Continue allopurinol 300 mg daily   -No evidence of tumor lysis at this time   -Serial Check uric acid  -The patient will have daily labs drawn. If there is any indication of tumor lysis syndrome he may need to be admitted for inpatient management.  -We'll prophylax with rasburicase with his therapy    -Diabetes mellitus  -Encouraged the patient to take his medications as prescribed  -The patient is going to be seen by her PCP next week.     -All medical records available in [facility] were reviewed and are summarized above.  -All labs and/or imaging studies mentioned in the note above were reviewed with and explained to the patient as a pertain to medical decision making.      He agreed and verbalized his agreement and understanding with the current plan.  I answered all questions and concerns he has at this time.      Please note that this dictation was created using voice recognition software. I have made every reasonable attempt to correct obvious errors, but I expect that there are errors of grammar and possibly content that I did not discover before finalizing the note.      SIGNATURES:  Shaila Garzon

## 2017-09-15 NOTE — TELEPHONE ENCOUNTER
Confirmed with [facility] pharmacy that they are able to order and fill rx for chlorambucil (Leukeran) 2mg tab- take 6 tabs by mouth every 14 days.  The pharmacy tech states it will cost the patient $293.  RN called and notified pt's son Ana.  He states that they will  the medication when it is ready.

## 2017-09-16 ENCOUNTER — HOSPITAL ENCOUNTER (OUTPATIENT)
Dept: LAB | Facility: MEDICAL CENTER | Age: 62
End: 2017-09-16
Attending: INTERNAL MEDICINE

## 2017-09-16 LAB
ALBUMIN SERPL BCP-MCNC: 4.2 G/DL (ref 3.2–4.9)
ALBUMIN/GLOB SERPL: 1.4 G/DL
ALP SERPL-CCNC: 730 U/L (ref 30–99)
ALT SERPL-CCNC: 13 U/L (ref 2–50)
ANION GAP SERPL CALC-SCNC: 6 MMOL/L (ref 0–11.9)
ANISOCYTOSIS BLD QL SMEAR: ABNORMAL
AST SERPL-CCNC: 23 U/L (ref 12–45)
BASOPHILS # BLD AUTO: 0 % (ref 0–1.8)
BASOPHILS # BLD: 0 K/UL (ref 0–0.12)
BILIRUB SERPL-MCNC: 0.5 MG/DL (ref 0.1–1.5)
BUN SERPL-MCNC: 22 MG/DL (ref 8–22)
CALCIUM SERPL-MCNC: 9.3 MG/DL (ref 8.5–10.5)
CHLORIDE SERPL-SCNC: 100 MMOL/L (ref 96–112)
CO2 SERPL-SCNC: 26 MMOL/L (ref 20–33)
CREAT SERPL-MCNC: 0.44 MG/DL (ref 0.5–1.4)
EOSINOPHIL # BLD AUTO: 0 K/UL (ref 0–0.51)
EOSINOPHIL NFR BLD: 0 % (ref 0–6.9)
ERYTHROCYTE [DISTWIDTH] IN BLOOD BY AUTOMATED COUNT: 57 FL (ref 35.9–50)
GFR SERPL CREATININE-BSD FRML MDRD: >60 ML/MIN/1.73 M 2
GLOBULIN SER CALC-MCNC: 3 G/DL (ref 1.9–3.5)
GLUCOSE SERPL-MCNC: 115 MG/DL (ref 65–99)
HCT VFR BLD AUTO: 38.4 % (ref 42–52)
HGB BLD-MCNC: 10.1 G/DL (ref 14–18)
LYMPHOCYTES # BLD AUTO: 389.07 K/UL (ref 1–4.8)
LYMPHOCYTES NFR BLD: 92 % (ref 22–41)
MACROCYTES BLD QL SMEAR: ABNORMAL
MANUAL DIFF BLD: NORMAL
MCH RBC QN AUTO: 26.4 PG (ref 27–33)
MCHC RBC AUTO-ENTMCNC: 26.3 G/DL (ref 33.7–35.3)
MCV RBC AUTO: 100.5 FL (ref 81.4–97.8)
MICROCYTES BLD QL SMEAR: ABNORMAL
MONOCYTES # BLD AUTO: 13.53 K/UL (ref 0–0.85)
MONOCYTES NFR BLD AUTO: 3.2 % (ref 0–13.4)
MORPHOLOGY BLD-IMP: NORMAL
NEUTROPHILS # BLD AUTO: 16.92 K/UL (ref 1.82–7.42)
NEUTROPHILS NFR BLD: 4 % (ref 44–72)
NRBC # BLD AUTO: 0.16 K/UL
NRBC BLD AUTO-RTO: 0 /100 WBC
OVALOCYTES BLD QL SMEAR: NORMAL
PLATELET # BLD AUTO: 120 K/UL (ref 164–446)
PLATELET BLD QL SMEAR: NORMAL
PMV BLD AUTO: 9.4 FL (ref 9–12.9)
POIKILOCYTOSIS BLD QL SMEAR: NORMAL
POTASSIUM SERPL-SCNC: 4.5 MMOL/L (ref 3.6–5.5)
PROMYELOCYTES NFR BLD MANUAL: 0.8 %
PROT SERPL-MCNC: 7.2 G/DL (ref 6–8.2)
RBC # BLD AUTO: 3.82 M/UL (ref 4.7–6.1)
RBC BLD AUTO: PRESENT
SMUDGE CELLS BLD QL SMEAR: NORMAL
SODIUM SERPL-SCNC: 132 MMOL/L (ref 135–145)
URATE SERPL-MCNC: 3.2 MG/DL (ref 2.5–8.3)
WBC # BLD AUTO: 422.9 K/UL (ref 4.8–10.8)

## 2017-09-16 PROCEDURE — 80053 COMPREHEN METABOLIC PANEL: CPT

## 2017-09-16 PROCEDURE — 84550 ASSAY OF BLOOD/URIC ACID: CPT

## 2017-09-16 PROCEDURE — 36415 COLL VENOUS BLD VENIPUNCTURE: CPT

## 2017-09-16 PROCEDURE — 85007 BL SMEAR W/DIFF WBC COUNT: CPT

## 2017-09-16 PROCEDURE — 85027 COMPLETE CBC AUTOMATED: CPT

## 2017-09-18 ENCOUNTER — HOSPITAL ENCOUNTER (OUTPATIENT)
Dept: LAB | Facility: MEDICAL CENTER | Age: 62
End: 2017-09-18
Attending: INTERNAL MEDICINE

## 2017-09-18 LAB
ALBUMIN SERPL BCP-MCNC: 4.1 G/DL (ref 3.2–4.9)
ALBUMIN/GLOB SERPL: 1.4 G/DL
ALP SERPL-CCNC: 451 U/L (ref 30–99)
ALT SERPL-CCNC: 14 U/L (ref 2–50)
ANION GAP SERPL CALC-SCNC: 9 MMOL/L (ref 0–11.9)
ANISOCYTOSIS BLD QL SMEAR: ABNORMAL
AST SERPL-CCNC: 21 U/L (ref 12–45)
BASOPHILS # BLD AUTO: 0 % (ref 0–1.8)
BASOPHILS # BLD: 0 K/UL (ref 0–0.12)
BILIRUB SERPL-MCNC: 0.4 MG/DL (ref 0.1–1.5)
BUN SERPL-MCNC: 22 MG/DL (ref 8–22)
BURR CELLS BLD QL SMEAR: NORMAL
CALCIUM SERPL-MCNC: 9.3 MG/DL (ref 8.5–10.5)
CHLORIDE SERPL-SCNC: 102 MMOL/L (ref 96–112)
CO2 SERPL-SCNC: 23 MMOL/L (ref 20–33)
CREAT SERPL-MCNC: 0.93 MG/DL (ref 0.5–1.4)
EOSINOPHIL # BLD AUTO: 0 K/UL (ref 0–0.51)
EOSINOPHIL NFR BLD: 0 % (ref 0–6.9)
GFR SERPL CREATININE-BSD FRML MDRD: >60 ML/MIN/1.73 M 2
GLOBULIN SER CALC-MCNC: 2.9 G/DL (ref 1.9–3.5)
GLUCOSE SERPL-MCNC: 179 MG/DL (ref 65–99)
HCT VFR BLD AUTO: 37.3 % (ref 42–52)
HGB BLD-MCNC: 9.9 G/DL (ref 14–18)
LYMPHOCYTES # BLD AUTO: 411.84 K/UL (ref 1–4.8)
LYMPHOCYTES NFR BLD: 95.6 % (ref 22–41)
MANUAL DIFF BLD: NORMAL
MCH RBC QN AUTO: 26.7 PG (ref 27–33)
MCHC RBC AUTO-ENTMCNC: 26.5 G/DL (ref 33.7–35.3)
MCV RBC AUTO: 100.5 FL (ref 81.4–97.8)
MICROCYTES BLD QL SMEAR: ABNORMAL
MONOCYTES # BLD AUTO: 11.2 K/UL (ref 0–0.85)
MONOCYTES NFR BLD AUTO: 2.6 % (ref 0–13.4)
MORPHOLOGY BLD-IMP: NORMAL
NEUTROPHILS # BLD AUTO: 3.88 K/UL (ref 1.82–7.42)
NEUTROPHILS NFR BLD: 0.9 % (ref 44–72)
NRBC # BLD AUTO: 0.03 K/UL
NRBC BLD AUTO-RTO: 0 /100 WBC
PLATELET # BLD AUTO: 109 K/UL (ref 164–446)
PLATELET BLD QL SMEAR: NORMAL
PMV BLD AUTO: 9.5 FL (ref 9–12.9)
POIKILOCYTOSIS BLD QL SMEAR: NORMAL
POTASSIUM SERPL-SCNC: 5.1 MMOL/L (ref 3.6–5.5)
PROMYELOCYTES NFR BLD MANUAL: 0.9 %
PROT SERPL-MCNC: 7 G/DL (ref 6–8.2)
RBC # BLD AUTO: 3.71 M/UL (ref 4.7–6.1)
RBC BLD AUTO: PRESENT
SODIUM SERPL-SCNC: 134 MMOL/L (ref 135–145)
URATE SERPL-MCNC: 3.2 MG/DL (ref 2.5–8.3)
WBC # BLD AUTO: 430.8 K/UL (ref 4.8–10.8)

## 2017-09-18 PROCEDURE — 85007 BL SMEAR W/DIFF WBC COUNT: CPT

## 2017-09-18 PROCEDURE — 85027 COMPLETE CBC AUTOMATED: CPT

## 2017-09-18 PROCEDURE — 84550 ASSAY OF BLOOD/URIC ACID: CPT

## 2017-09-18 PROCEDURE — 36415 COLL VENOUS BLD VENIPUNCTURE: CPT

## 2017-09-18 PROCEDURE — 80053 COMPREHEN METABOLIC PANEL: CPT

## 2017-09-19 ENCOUNTER — TELEPHONE (OUTPATIENT)
Dept: HEMATOLOGY ONCOLOGY | Facility: MEDICAL CENTER | Age: 62
End: 2017-09-19

## 2017-09-19 ENCOUNTER — HOSPITAL ENCOUNTER (OUTPATIENT)
Dept: LAB | Facility: MEDICAL CENTER | Age: 62
End: 2017-09-19
Attending: INTERNAL MEDICINE

## 2017-09-19 ENCOUNTER — OFFICE VISIT (OUTPATIENT)
Dept: MEDICAL GROUP | Facility: MEDICAL CENTER | Age: 62
End: 2017-09-19

## 2017-09-19 VITALS
WEIGHT: 134.26 LBS | BODY MASS INDEX: 22.92 KG/M2 | HEIGHT: 64 IN | TEMPERATURE: 97.8 F | HEART RATE: 92 BPM | OXYGEN SATURATION: 97 % | RESPIRATION RATE: 14 BRPM | DIASTOLIC BLOOD PRESSURE: 70 MMHG | SYSTOLIC BLOOD PRESSURE: 124 MMHG

## 2017-09-19 DIAGNOSIS — E79.0 HYPERURICEMIA: ICD-10-CM

## 2017-09-19 DIAGNOSIS — E11.9 DIABETES MELLITUS TYPE 2 IN NONOBESE (HCC): ICD-10-CM

## 2017-09-19 DIAGNOSIS — Z00.00 HEALTHCARE MAINTENANCE: ICD-10-CM

## 2017-09-19 DIAGNOSIS — C91.10 CLL (CHRONIC LYMPHOCYTIC LEUKEMIA) (HCC): ICD-10-CM

## 2017-09-19 PROBLEM — M10.9 GOUT, ARTHRITIS: Status: RESOLVED | Noted: 2017-09-19 | Resolved: 2017-09-19

## 2017-09-19 PROBLEM — M10.9 GOUT, ARTHRITIS: Status: ACTIVE | Noted: 2017-09-19

## 2017-09-19 LAB
ALBUMIN SERPL BCP-MCNC: 4.4 G/DL (ref 3.2–4.9)
ALBUMIN/GLOB SERPL: 1.8 G/DL
ALP SERPL-CCNC: 503 U/L (ref 30–99)
ALT SERPL-CCNC: 13 U/L (ref 2–50)
ANION GAP SERPL CALC-SCNC: 9 MMOL/L (ref 0–11.9)
ANISOCYTOSIS BLD QL SMEAR: ABNORMAL
AST SERPL-CCNC: 17 U/L (ref 12–45)
BASOPHILS # BLD AUTO: 0 % (ref 0–1.8)
BASOPHILS # BLD: 0 K/UL (ref 0–0.12)
BILIRUB SERPL-MCNC: 0.5 MG/DL (ref 0.1–1.5)
BUN SERPL-MCNC: 24 MG/DL (ref 8–22)
CALCIUM SERPL-MCNC: 9.2 MG/DL (ref 8.5–10.5)
CHLORIDE SERPL-SCNC: 100 MMOL/L (ref 96–112)
CO2 SERPL-SCNC: 25 MMOL/L (ref 20–33)
CREAT SERPL-MCNC: 0.99 MG/DL (ref 0.5–1.4)
EOSINOPHIL # BLD AUTO: 0 K/UL (ref 0–0.51)
EOSINOPHIL NFR BLD: 0 % (ref 0–6.9)
GFR SERPL CREATININE-BSD FRML MDRD: >60 ML/MIN/1.73 M 2
GLOBULIN SER CALC-MCNC: 2.5 G/DL (ref 1.9–3.5)
GLUCOSE SERPL-MCNC: 245 MG/DL (ref 65–99)
HBA1C MFR BLD: 7.6 % (ref ?–5.8)
HCT VFR BLD AUTO: 36.5 % (ref 42–52)
HGB BLD-MCNC: 9.8 G/DL (ref 14–18)
INT CON NEG: NEGATIVE
INT CON POS: POSITIVE
LYMPHOCYTES # BLD AUTO: 426.05 K/UL (ref 1–4.8)
LYMPHOCYTES NFR BLD: 96 % (ref 22–41)
MANUAL DIFF BLD: NORMAL
MCH RBC QN AUTO: 26.6 PG (ref 27–33)
MCHC RBC AUTO-ENTMCNC: 26.8 G/DL (ref 33.7–35.3)
MCV RBC AUTO: 99.2 FL (ref 81.4–97.8)
MONOCYTES # BLD AUTO: 7.1 K/UL (ref 0–0.85)
MONOCYTES NFR BLD AUTO: 1.6 % (ref 0–13.4)
MORPHOLOGY BLD-IMP: NORMAL
NEUTROPHILS # BLD AUTO: 10.65 K/UL (ref 1.82–7.42)
NEUTROPHILS NFR BLD: 2.4 % (ref 44–72)
NRBC # BLD AUTO: 0.05 K/UL
NRBC BLD AUTO-RTO: 0 /100 WBC
OVALOCYTES BLD QL SMEAR: NORMAL
PLATELET # BLD AUTO: 117 K/UL (ref 164–446)
PLATELET BLD QL SMEAR: NORMAL
PMV BLD AUTO: 9.7 FL (ref 9–12.9)
POIKILOCYTOSIS BLD QL SMEAR: NORMAL
POTASSIUM SERPL-SCNC: 4.2 MMOL/L (ref 3.6–5.5)
PROT SERPL-MCNC: 6.9 G/DL (ref 6–8.2)
RBC # BLD AUTO: 3.68 M/UL (ref 4.7–6.1)
RBC BLD AUTO: PRESENT
SODIUM SERPL-SCNC: 134 MMOL/L (ref 135–145)
URATE SERPL-MCNC: 3 MG/DL (ref 2.5–8.3)
WBC # BLD AUTO: >440 K/UL (ref 4.8–10.8)

## 2017-09-19 PROCEDURE — 36415 COLL VENOUS BLD VENIPUNCTURE: CPT

## 2017-09-19 PROCEDURE — 80053 COMPREHEN METABOLIC PANEL: CPT

## 2017-09-19 PROCEDURE — 84550 ASSAY OF BLOOD/URIC ACID: CPT

## 2017-09-19 PROCEDURE — 85007 BL SMEAR W/DIFF WBC COUNT: CPT

## 2017-09-19 PROCEDURE — 83036 HEMOGLOBIN GLYCOSYLATED A1C: CPT | Performed by: FAMILY MEDICINE

## 2017-09-19 PROCEDURE — 85027 COMPLETE CBC AUTOMATED: CPT

## 2017-09-19 PROCEDURE — 99203 OFFICE O/P NEW LOW 30 MIN: CPT | Performed by: FAMILY MEDICINE

## 2017-09-19 RX ORDER — DEXAMETHASONE 1 MG
20 TABLET ORAL DAILY
Qty: 40 TAB | Refills: 0 | Status: SHIPPED | OUTPATIENT
Start: 2017-09-20 | End: 2017-09-22

## 2017-09-19 RX ORDER — GLIMEPIRIDE 2 MG/1
2 TABLET ORAL EVERY MORNING
COMMUNITY

## 2017-09-19 ASSESSMENT — PATIENT HEALTH QUESTIONNAIRE - PHQ9: CLINICAL INTERPRETATION OF PHQ2 SCORE: 0

## 2017-09-19 NOTE — PROGRESS NOTES
CC: Establish a new PCP    HPI:  Said presents today to establish a new PCP.    Patient is visiting from [facility] . Patient has been active, and independent with all ADLs. Has the following medical issues:    Diabetes mellitus type 2 , has not been adequately controlled, his A1C today is 7.6, probably because of the oral steroid he as been taking as pre chemotherapy treatment.he has been on Glimepiride. Blood glucose at home has been fluctuating, multiple reading above 200.However patient stated he has not been taking hs medication because the reading before the oral steroid was fine.Denies polyuria, and polydipsia. Denies hypoglycemic episodes.    Chronic lymphocytic leukemia, diagnosed about 10 yrs ago, has been stable. He was recently seen by oncologist, who planned to start him on chemotherapy next week.He was put on Dexamethasone.    Will defer flu shot for now.patient will start the chemotherapy soon.    Has h/o hyperuricemia, which is probably as a result of the leukemic reaction( cells destruction).However last uric acid level was normal.has been on Allopurinol.    Flu shot deferred.      Patient Active Problem List    Diagnosis Date Noted   • Diabetes mellitus type 2 in nonobese (CMS-HCC) 09/19/2017   • Hyperuricemia 09/19/2017   • CLL (chronic lymphocytic leukemia) (CMS-HCC) 09/12/2017       Current Outpatient Prescriptions   Medication Sig Dispense Refill   • aspirin EC (ECOTRIN) 81 MG Tablet Delayed Response Take 81 mg by mouth every day.     • Hyaluronate Sodium 0.2 % Solution by Apply externally route.     • glimepiride (AMARYL) 2 MG Tab Take 2 mg by mouth every morning.     • metformin (GLUCOPHAGE) 1000 MG tablet Take 1 Tab by mouth 2 times a day, with meals. 180 Tab 3   • dexamethasone (DECADRON) 4 MG Tab Take 10 Tabs by mouth every thursday for 2 doses. 20 Tab 0   • acyclovir (ZOVIRAX) 400 MG tablet Take 1 Tab by mouth 2 times a day. 30 Tab 5   • allopurinol (ZYLOPRIM) 100 MG Tab Take 3 Tabs by mouth  "2 times a day for 30 days. 30 Tab 0   • [START ON 9/20/2017] dexamethasone (DECADRON) 1 MG Tab Take 1 Tab by mouth every day for 2 days. 10 Tab 0   • chlorambucil (LEUKERAN) 2 MG Tab Take 6 Tabs by mouth every 14 days. 12 Tab 0     No current facility-administered medications for this visit.          Allergies as of 09/19/2017   • (Not on File)        Social History     Social History   • Marital status:      Spouse name: N/A   • Number of children: N/A   • Years of education: N/A     Occupational History   • retired       Social History Main Topics   • Smoking status: Never Smoker   • Smokeless tobacco: Never Used   • Alcohol use No   • Drug use: No   • Sexual activity: Yes     Partners: Female     Other Topics Concern   • Not on file     Social History Narrative   • No narrative on file       Family History   Problem Relation Age of Onset   • No Known Problems Mother    • No Known Problems Father        No past surgical history on file.    ROS:  Denies any Headache, Blurred Vision, Confusion Chest pain,  Shortness of breath,  Abdominal pain, Changes of bowel or bladder, Lower ext edema, Fevers, Nights sweats, Weight Changes, Focal weakness or numbness.  All other systems are negative.    /70   Pulse 92   Temp 36.6 °C (97.8 °F)   Resp 14   Ht 1.626 m (5' 4\")   Wt 60.9 kg (134 lb 4.2 oz)   SpO2 97%   BMI 23.05 kg/m²     Physical Exam:  Gen:         Alert and oriented, No apparent distress.  HEENT:   Perrla, TM clear,  Oralpharynx no erythema or exudates.  Neck:       No Jugular venous distension, Lymphadenopathy, Thyromegaly, Bruits.  Lungs:     Clear to auscultation bilaterally  CV:          Regular rate and rhythm. No murmurs, rubs or gallops.  Abd:         Soft non tender, non distended. Normal active bowel sounds. No hepatosplenomegaly, No pulsatile masses.  Ext:          No clubbing, cyanosis, edema.      Assessment and Plan.   62 y.o. male     1. Diabetes mellitus type 2 in nonobese " (CMS-HCC)  Not adequately controlled,A1C today is 7.6,  probably because of the oral steroid he as been taking as pre chemotherapy treatment.  Has been on Glimepiride. Blood glucose at home has been fluctuating, multiple reading above 200.However patient stated he has not been taking hs medication bevcause the reading before the oral steroid was fine.  I changed Glimepiride to metformin 1000 mg bid.  Patient advised to check his blood sugar 3 times daily for a week and send it to me for evaluation.    - metformin (GLUCOPHAGE) 1000 MG tablet; Take 1 Tab by mouth 2 times a day, with meals.  Dispense: 180 Tab; Refill: 3  - POCT  A1C    2. CLL (chronic lymphocytic leukemia) (CMS-[facility])  Was seen by oncologist, the plan is to start him on chemotherapy next week.  Current on Dexamethasone.  Continue follow up with oncology.    3. Healthcare maintenance  Will defer flu shot for now.patient will start the chemotherapy soon.    4. Hyperuricemia  Probably as a result of the leukemic reaction( cells destruction).  However last uric acid level was normal.  Continue on Allopurinol.

## 2017-09-19 NOTE — TELEPHONE ENCOUNTER
After hours answering service received call at:    2017-09-15 20:27:04 PDT from Lorraine at [phone] stating:    Critical labs.    Please call.

## 2017-09-19 NOTE — TELEPHONE ENCOUNTER
After hours answering service received call at:    2017-09-16 18:20:18 PDT from Deedee at [phone] stating:    Critical Lab results, please call.

## 2017-09-19 NOTE — TELEPHONE ENCOUNTER
Radha from lab called 9/18/2017,  with critical results: .8, Dr. Garzon updated, no orders received    Radha from lab called today with critical results: WBC >440.0, routed to Dr. Garzon

## 2017-09-20 ENCOUNTER — NON-PROVIDER VISIT (OUTPATIENT)
Dept: HEMATOLOGY ONCOLOGY | Facility: MEDICAL CENTER | Age: 62
End: 2017-09-20

## 2017-09-20 ENCOUNTER — HOSPITAL ENCOUNTER (OUTPATIENT)
Dept: LAB | Facility: MEDICAL CENTER | Age: 62
End: 2017-09-20
Attending: INTERNAL MEDICINE
Payer: COMMERCIAL

## 2017-09-20 VITALS
RESPIRATION RATE: 16 BRPM | SYSTOLIC BLOOD PRESSURE: 142 MMHG | OXYGEN SATURATION: 96 % | HEIGHT: 64 IN | DIASTOLIC BLOOD PRESSURE: 78 MMHG | WEIGHT: 133.6 LBS | BODY MASS INDEX: 22.81 KG/M2 | HEART RATE: 96 BPM | TEMPERATURE: 97.8 F

## 2017-09-20 DIAGNOSIS — C91.10 CLL (CHRONIC LYMPHOCYTIC LEUKEMIA) (HCC): ICD-10-CM

## 2017-09-20 LAB
ALBUMIN SERPL BCP-MCNC: 4.1 G/DL (ref 3.2–4.9)
ALBUMIN/GLOB SERPL: 1.5 G/DL
ALP SERPL-CCNC: 498 U/L (ref 30–99)
ALT SERPL-CCNC: 16 U/L (ref 2–50)
ANION GAP SERPL CALC-SCNC: 10 MMOL/L (ref 0–11.9)
ANISOCYTOSIS BLD QL SMEAR: ABNORMAL
AST SERPL-CCNC: 19 U/L (ref 12–45)
BASOPHILS # BLD AUTO: 0 % (ref 0–1.8)
BASOPHILS # BLD: 0 K/UL (ref 0–0.12)
BILIRUB SERPL-MCNC: 0.5 MG/DL (ref 0.1–1.5)
BUN SERPL-MCNC: 26 MG/DL (ref 8–22)
BURR CELLS BLD QL SMEAR: NORMAL
CALCIUM SERPL-MCNC: 9.6 MG/DL (ref 8.5–10.5)
CHLORIDE SERPL-SCNC: 101 MMOL/L (ref 96–112)
CO2 SERPL-SCNC: 21 MMOL/L (ref 20–33)
CREAT SERPL-MCNC: 1.01 MG/DL (ref 0.5–1.4)
EOSINOPHIL # BLD AUTO: 0 K/UL (ref 0–0.51)
EOSINOPHIL NFR BLD: 0 % (ref 0–6.9)
ERYTHROCYTE [DISTWIDTH] IN BLOOD BY AUTOMATED COUNT: 59.2 FL (ref 35.9–50)
GFR SERPL CREATININE-BSD FRML MDRD: >60 ML/MIN/1.73 M 2
GLOBULIN SER CALC-MCNC: 2.7 G/DL (ref 1.9–3.5)
GLUCOSE SERPL-MCNC: 206 MG/DL (ref 65–99)
HCT VFR BLD AUTO: 38.9 % (ref 42–52)
HGB BLD-MCNC: 9.6 G/DL (ref 14–18)
LYMPHOCYTES # BLD AUTO: 393.02 K/UL (ref 1–4.8)
LYMPHOCYTES NFR BLD: 89.2 % (ref 22–41)
MANUAL DIFF BLD: NORMAL
MCH RBC QN AUTO: 25.5 PG (ref 27–33)
MCHC RBC AUTO-ENTMCNC: 24.7 G/DL (ref 33.7–35.3)
MCV RBC AUTO: 103.5 FL (ref 81.4–97.8)
MICROCYTES BLD QL SMEAR: ABNORMAL
MONOCYTES # BLD AUTO: 22.03 K/UL (ref 0–0.85)
MONOCYTES NFR BLD AUTO: 5 % (ref 0–13.4)
MORPHOLOGY BLD-IMP: NORMAL
NEUTROPHILS # BLD AUTO: 25.55 K/UL (ref 1.82–7.42)
NEUTROPHILS NFR BLD: 5 % (ref 44–72)
NEUTS BAND NFR BLD MANUAL: 0.8 % (ref 0–10)
NRBC # BLD AUTO: 0.05 K/UL
NRBC BLD AUTO-RTO: 0 /100 WBC
OVALOCYTES BLD QL SMEAR: NORMAL
PLATELET # BLD AUTO: 135 K/UL (ref 164–446)
PLATELET BLD QL SMEAR: NORMAL
PMV BLD AUTO: 9.8 FL (ref 9–12.9)
POIKILOCYTOSIS BLD QL SMEAR: NORMAL
POLYCHROMASIA BLD QL SMEAR: NORMAL
POTASSIUM SERPL-SCNC: 4.7 MMOL/L (ref 3.6–5.5)
PROT SERPL-MCNC: 6.8 G/DL (ref 6–8.2)
RBC # BLD AUTO: 3.76 M/UL (ref 4.7–6.1)
RBC BLD AUTO: PRESENT
SCHISTOCYTES BLD QL SMEAR: NORMAL
SMUDGE CELLS BLD QL SMEAR: NORMAL
SODIUM SERPL-SCNC: 132 MMOL/L (ref 135–145)
URATE SERPL-MCNC: 3 MG/DL (ref 2.5–8.3)
VARIANT LYMPHS BLD QL SMEAR: NORMAL
WBC # BLD AUTO: >440 K/UL (ref 4.8–10.8)

## 2017-09-20 PROCEDURE — 84550 ASSAY OF BLOOD/URIC ACID: CPT

## 2017-09-20 PROCEDURE — 80053 COMPREHEN METABOLIC PANEL: CPT

## 2017-09-20 PROCEDURE — 36415 COLL VENOUS BLD VENIPUNCTURE: CPT

## 2017-09-20 PROCEDURE — 85027 COMPLETE CBC AUTOMATED: CPT

## 2017-09-20 PROCEDURE — 85007 BL SMEAR W/DIFF WBC COUNT: CPT

## 2017-09-20 RX ORDER — ONDANSETRON 4 MG/1
4 TABLET, FILM COATED ORAL EVERY 4 HOURS PRN
Qty: 30 TAB | Refills: 6 | Status: SHIPPED | OUTPATIENT
Start: 2017-09-20 | End: 2018-08-30

## 2017-09-20 RX ORDER — PROCHLORPERAZINE MALEATE 10 MG
10 TABLET ORAL EVERY 6 HOURS PRN
Qty: 30 TAB | Refills: 6 | Status: SHIPPED | OUTPATIENT
Start: 2017-09-20 | End: 2018-08-30

## 2017-09-20 NOTE — NON-PROVIDER
Patient is established with Dr. Garzon for Chronic Lymphocytic Leukemia.  He will be starting on chlorambucil and obinutuzumab chemotherapy, and is here today for a pre-chemotherapy teaching appointment. Patient is accompanied by his son, Ana.     Educated patient on chemotherapy including but not limited to: Infusion Policies and procedures, cycling of chemotherapy, length of treatment, alopecia, myelosuppression, infection prevention, fatigue, GI distress, use of specific nausea medications, avoidance of alcoholic beverages and supplement medications, use of sunscreen, chemotherapy precautions at home, and invasive procedures. Patient was provided with drug specific handouts, NCI chemotherapy and you book, NCI eating hints book, [facility] side effects sheet. Patient was given tour of Infusion Services and shown where to park and check-in.     Confirmed patient has picked up chlorambucil from [facility] and reviewed instructions on taking 12mg on D1 and D15 1 hour before eating or two hours after eating. Also confirmed is taking allopurinol and provided education on tumor lysis syndrome.  Prescriptions for zofran and compazine sent to [facility] pharmacy.     A total of 90 minutes was spent with this patient for chemotherapy education.

## 2017-09-21 ENCOUNTER — HOSPITAL ENCOUNTER (OUTPATIENT)
Dept: LAB | Facility: MEDICAL CENTER | Age: 62
End: 2017-09-21
Attending: INTERNAL MEDICINE

## 2017-09-21 ENCOUNTER — TELEPHONE (OUTPATIENT)
Dept: HEMATOLOGY ONCOLOGY | Facility: MEDICAL CENTER | Age: 62
End: 2017-09-21

## 2017-09-21 LAB
ALBUMIN SERPL BCP-MCNC: 4.3 G/DL (ref 3.2–4.9)
ALBUMIN/GLOB SERPL: 1.5 G/DL
ALP SERPL-CCNC: 177 U/L (ref 30–99)
ALT SERPL-CCNC: 15 U/L (ref 2–50)
ANION GAP SERPL CALC-SCNC: 8 MMOL/L (ref 0–11.9)
ANISOCYTOSIS BLD QL SMEAR: ABNORMAL
AST SERPL-CCNC: 15 U/L (ref 12–45)
BASOPHILS # BLD AUTO: 0 % (ref 0–1.8)
BASOPHILS # BLD: 0 K/UL (ref 0–0.12)
BILIRUB SERPL-MCNC: 0.6 MG/DL (ref 0.1–1.5)
BUN SERPL-MCNC: 24 MG/DL (ref 8–22)
BURR CELLS BLD QL SMEAR: NORMAL
CALCIUM SERPL-MCNC: 9.6 MG/DL (ref 8.5–10.5)
CHLORIDE SERPL-SCNC: 100 MMOL/L (ref 96–112)
CO2 SERPL-SCNC: 28 MMOL/L (ref 20–33)
CREAT SERPL-MCNC: 1.01 MG/DL (ref 0.5–1.4)
EOSINOPHIL # BLD AUTO: 0 K/UL (ref 0–0.51)
EOSINOPHIL NFR BLD: 0 % (ref 0–6.9)
ERYTHROCYTE [DISTWIDTH] IN BLOOD BY AUTOMATED COUNT: 57.3 FL (ref 35.9–50)
GFR SERPL CREATININE-BSD FRML MDRD: >60 ML/MIN/1.73 M 2
GLOBULIN SER CALC-MCNC: 2.8 G/DL (ref 1.9–3.5)
GLUCOSE SERPL-MCNC: 161 MG/DL (ref 65–99)
HCT VFR BLD AUTO: 40.2 % (ref 42–52)
HGB BLD-MCNC: 10.5 G/DL (ref 14–18)
LYMPHOCYTES # BLD AUTO: 402.37 K/UL (ref 1–4.8)
LYMPHOCYTES NFR BLD: 91.2 % (ref 22–41)
MANUAL DIFF BLD: NORMAL
MCH RBC QN AUTO: 26.4 PG (ref 27–33)
MCHC RBC AUTO-ENTMCNC: 26.1 G/DL (ref 33.7–35.3)
MCV RBC AUTO: 101 FL (ref 81.4–97.8)
MICROCYTES BLD QL SMEAR: ABNORMAL
MONOCYTES # BLD AUTO: 3.53 K/UL (ref 0–0.85)
MONOCYTES NFR BLD AUTO: 0.8 % (ref 0–13.4)
MORPHOLOGY BLD-IMP: NORMAL
MYELOCYTES NFR BLD MANUAL: 0.8 %
NEUTROPHILS # BLD AUTO: 31.77 K/UL (ref 1.82–7.42)
NEUTROPHILS NFR BLD: 7.2 % (ref 44–72)
NRBC # BLD AUTO: 0 K/UL
NRBC BLD AUTO-RTO: 0 /100 WBC
OVALOCYTES BLD QL SMEAR: NORMAL
PLATELET # BLD AUTO: 140 K/UL (ref 164–446)
PLATELET BLD QL SMEAR: NORMAL
PMV BLD AUTO: 9.3 FL (ref 9–12.9)
POIKILOCYTOSIS BLD QL SMEAR: NORMAL
POTASSIUM SERPL-SCNC: 4.1 MMOL/L (ref 3.6–5.5)
PROT SERPL-MCNC: 7.1 G/DL (ref 6–8.2)
RBC # BLD AUTO: 3.98 M/UL (ref 4.7–6.1)
RBC BLD AUTO: PRESENT
SMUDGE CELLS BLD QL SMEAR: NORMAL
SODIUM SERPL-SCNC: 136 MMOL/L (ref 135–145)
URATE SERPL-MCNC: 4.1 MG/DL (ref 2.5–8.3)
WBC # BLD AUTO: >440 K/UL (ref 4.8–10.8)

## 2017-09-21 PROCEDURE — 80053 COMPREHEN METABOLIC PANEL: CPT

## 2017-09-21 PROCEDURE — 84550 ASSAY OF BLOOD/URIC ACID: CPT

## 2017-09-21 PROCEDURE — 85027 COMPLETE CBC AUTOMATED: CPT

## 2017-09-21 PROCEDURE — 85007 BL SMEAR W/DIFF WBC COUNT: CPT

## 2017-09-21 PROCEDURE — 36415 COLL VENOUS BLD VENIPUNCTURE: CPT

## 2017-09-21 NOTE — TELEPHONE ENCOUNTER
After hours answering service received call at:    2017-09-20 21:53:06 PDT from Marah in the lab at [phone] stating:    has critical lab results.

## 2017-09-21 NOTE — TELEPHONE ENCOUNTER
Tech from lab called with critical WBC count of >440.0. Result read back. Dr. Garzon aware. No orders received.

## 2017-09-21 NOTE — TELEPHONE ENCOUNTER
RN called pt's son, Ana at [phone] back regarding pt's blood sugars.  Requested call back to RN at [phone].    Called lab back and spoke with Jessie, she states they reported the WBC >440.0 to Dr. Mcbride last night.  Dr. Landir aware of WBC. Pt dx: CLL and taking 20mg decadron daily.

## 2017-09-21 NOTE — TELEPHONE ENCOUNTER
After hours answering service received call at:    2017-09-20 18:19:16 PDT from Kelley Whitaker at [phone] stating:    High Blood sugar in the 500's. Taking leukemia medicine and caller is concerned

## 2017-09-22 ENCOUNTER — TELEPHONE (OUTPATIENT)
Dept: HEMATOLOGY ONCOLOGY | Facility: MEDICAL CENTER | Age: 62
End: 2017-09-22

## 2017-09-22 ENCOUNTER — DOCUMENTATION (OUTPATIENT)
Dept: HEMATOLOGY ONCOLOGY | Facility: MEDICAL CENTER | Age: 62
End: 2017-09-22

## 2017-09-22 ENCOUNTER — OUTPATIENT INFUSION SERVICES (OUTPATIENT)
Dept: ONCOLOGY | Facility: MEDICAL CENTER | Age: 62
End: 2017-09-22
Attending: INTERNAL MEDICINE
Payer: COMMERCIAL

## 2017-09-22 VITALS
BODY MASS INDEX: 22.08 KG/M2 | DIASTOLIC BLOOD PRESSURE: 59 MMHG | RESPIRATION RATE: 18 BRPM | SYSTOLIC BLOOD PRESSURE: 113 MMHG | HEART RATE: 83 BPM | TEMPERATURE: 98 F | OXYGEN SATURATION: 96 % | WEIGHT: 132.5 LBS | HEIGHT: 65 IN

## 2017-09-22 DIAGNOSIS — C91.10 CLL (CHRONIC LYMPHOCYTIC LEUKEMIA) (HCC): ICD-10-CM

## 2017-09-22 PROCEDURE — 700111 HCHG RX REV CODE 636 W/ 250 OVERRIDE (IP): Performed by: INTERNAL MEDICINE

## 2017-09-22 PROCEDURE — A9270 NON-COVERED ITEM OR SERVICE: HCPCS | Performed by: INTERNAL MEDICINE

## 2017-09-22 PROCEDURE — 700105 HCHG RX REV CODE 258

## 2017-09-22 PROCEDURE — 96361 HYDRATE IV INFUSION ADD-ON: CPT

## 2017-09-22 PROCEDURE — 700105 HCHG RX REV CODE 258: Performed by: INTERNAL MEDICINE

## 2017-09-22 PROCEDURE — 96415 CHEMO IV INFUSION ADDL HR: CPT

## 2017-09-22 PROCEDURE — 700102 HCHG RX REV CODE 250 W/ 637 OVERRIDE(OP): Performed by: INTERNAL MEDICINE

## 2017-09-22 PROCEDURE — 96413 CHEMO IV INFUSION 1 HR: CPT

## 2017-09-22 PROCEDURE — 96375 TX/PRO/DX INJ NEW DRUG ADDON: CPT

## 2017-09-22 RX ORDER — SODIUM CHLORIDE 9 MG/ML
1000 INJECTION, SOLUTION INTRAVENOUS CONTINUOUS
Status: DISCONTINUED | OUTPATIENT
Start: 2017-09-22 | End: 2017-09-22 | Stop reason: HOSPADM

## 2017-09-22 RX ORDER — ACETAMINOPHEN 500 MG
1000 TABLET ORAL ONCE
Status: COMPLETED | OUTPATIENT
Start: 2017-09-22 | End: 2017-09-22

## 2017-09-22 RX ADMIN — OBINUTUZUMAB 100 MG: 1000 INJECTION, SOLUTION, CONCENTRATE INTRAVENOUS at 12:25

## 2017-09-22 RX ADMIN — SODIUM CHLORIDE 1000 ML: 9 INJECTION, SOLUTION INTRAVENOUS at 11:30

## 2017-09-22 RX ADMIN — DIPHENHYDRAMINE HYDROCHLORIDE 25 MG: 50 INJECTION INTRAMUSCULAR; INTRAVENOUS at 11:12

## 2017-09-22 RX ADMIN — DEXAMETHASONE SODIUM PHOSPHATE 20 MG: 4 INJECTION, SOLUTION INTRAMUSCULAR; INTRAVENOUS at 11:45

## 2017-09-22 RX ADMIN — ACETAMINOPHEN 1000 MG: 500 TABLET ORAL at 11:12

## 2017-09-22 ASSESSMENT — PAIN SCALES - GENERAL: PAINLEVEL: NO PAIN

## 2017-09-22 NOTE — PROGRESS NOTES
"Nutrition Services:  Per 2002 Nutritional Risk Screening guidelines, patient with score = 2.  Categorized as moderate level of impaired nutritional status, as evidenced by hypermetabolic disease state, need for chemotherapy regimen.     Pt Dx: Stage III Chronic lymphocytic leukemia of the blood, diagnosed ~10 years ago; stable, per MD documentation.    Hx of diabetes mellitus type 2, A1C = 7.6 s/p oral steroid started.    RD met with patient & pt's wife today to establish rapport and review estimated nutrient needs for weight maintenance throughout cancer treatment.  They are visiting daughter and grandchildren from [facility].  Pt declined phone  at this time.  He presents to [facility] today for Day 1, Cycle 1 of chemotherapy treatment.    · RD reviewed potential side of effects during chemotherapy that could become nutritionally pertinent: N/V, diarrhea, constipation, changes in taste, loss of appetite resulting in weight loss.  He is not currently experiencing any of these symptoms, aside from some diarrhea (mild, he states).  · Pt continues with normal appetite and has recently increased his water intake in preparation of chemotherapy for adequate hydration status.    · Pt's favorite foods include all seafood/fish, fresh fruits, fresh vegetables, nuts and \"natural yogurt\" (patient is unsure of specific brand).  RD encouraged patient to wash produce thoroughly prior to enjoying.  He did inquire about eating organic produce; RD encouraged produce from organic gardens and conventional gardens and focused on recommendation for increased intake for antioxidant variety.  He verbalizes very good understanding.  · Handout was provided with RD contact information and high kcal/high protein/high fat snack choices, in the event that poor PO intake becomes problematic for patient.      Labs per 9/21: glucose 161, BUN 24, alk phos 177  Meds: Zofran prn, Compazine prn, Glucophage, Decadron.     Plan/Recommend:  Additional " goals as stated above.     MALES  cm/kg     Nutrition Needs Assessment:       Height (inches) 65 165.1     Weight (lbs) 132.0 60.00     Age (years) 62      BMI 22      Total Daily Calorie Needs per MSJ x1.3  1718 29 kcal/kg   Total Daily Protein Needs (1.2 - 1.5 g/kg Act BW) 72 90 g/kg    Daily Fluids (30 mL/kgAct BW) 1800

## 2017-09-22 NOTE — PROGRESS NOTES
"Pharmacy Chemotherapy Note    Second Check    Patient Name: Amanuel Zamora  [id number]  Oncologist: Duran    Protocol: OP CLL Obinutuzumab    Chlorambucil:  0.5 mg/kg PO days 1 and 15    Obinutuzumab:   100 mg IV on day 1 cycle 1 followed by  900 mg IV on day 2 of cycle 1, followed by  1000 mg IV on days 8 and 15 of cycle 1, followed by  1000 mg IV on day 1 cycles 2-6    *Dosing Reference*  NCCN guidelines for chronic lymphocytic leukemia/small lymphocytic lymphoma V.1.2017  Beatriz V, et al. N Engl J Med. 2014;370(12):1101-10      Dx: CLL         /89   Pulse 87   Temp 36.7 °C (98 °F)   Resp 18   Ht 1.65 m (5' 4.96\")   Wt 60.1 kg (132 lb 7.9 oz)   SpO2 96%   BMI 22.08 kg/m²  Body surface area is 1.66 meters squared.    Lab Results   Component Value Date/Time    SODIUM 136 09/21/2017 09:51 AM    POTASSIUM 4.1 09/21/2017 09:51 AM    CHLORIDE 100 09/21/2017 09:51 AM    CO2 28 09/21/2017 09:51 AM    GLUCOSE 161 (H) 09/21/2017 09:51 AM    BUN 24 (H) 09/21/2017 09:51 AM    CREATININE 1.01 09/21/2017 09:51 AM      Lab Results   Component Value Date/Time    WBC >440.0 (HH) 09/21/2017 09:51 AM    RBC 3.98 (L) 09/21/2017 09:51 AM    HEMOGLOBIN 10.5 (L) 09/21/2017 09:51 AM    HEMATOCRIT 40.2 (L) 09/21/2017 09:51 AM    .0 (H) 09/21/2017 09:51 AM    MCH 26.4 (L) 09/21/2017 09:51 AM    MCHC 26.1 (L) 09/21/2017 09:51 AM    MPV 9.3 09/21/2017 09:51 AM    NEUTSPOLYS 7.20 (L) 09/21/2017 09:51 AM    LYMPHOCYTES 91.20 (H) 09/21/2017 09:51 AM    MONOCYTES 0.80 09/21/2017 09:51 AM    EOSINOPHILS 0.00 09/21/2017 09:51 AM    BASOPHILS 0.00 09/21/2017 09:51 AM    HYPOCHROMIA 1+ 09/13/2017 09:57 AM    ANISOCYTOSIS 1+ 09/21/2017 09:51 AM       WBC: 440.0     Plt: 140k   Hgb/Hct: 10.5/40.2     SCr: 1.01mg/dL CrCl: 64 mL/min    Uric Acid: 4.1   K: 4.1  Na: 136          Glu: 161  LFT's: 15/15 TBili = 0.6         Drug Order   (Drug name, dose, route, IV Fluid & volume, frequency, number of doses) Cycle: 1 Day: 1      Previous " treatment: New Start     Medication = Obinutuzumab  Base Dose= 100 mg fixed dose  Calc Dose: Base Dose: no calc. needed  Final Dose = 100 mg  Route = IV  Fluid & Volume =  mL  Admin Duration = Over 4 hours          <5% difference, OK to treat with final dose      By my signature below, I confirm this process was performed independently with the BSA and all final chemotherapy dosing calculations congruent. I have reviewed the above chemotherapy order and that my calculation of the final dose and BSA (when applicable) corroborate those calculations of the  pharmacist. Discrepancies of 5% or greater in the written dose have been addressed and documented within the EPIC Progress notes.    Martinez Frederick, PharmD

## 2017-09-22 NOTE — PROGRESS NOTES
Chemotherapy Verification - SECONDARY RN       Height = 165cm  Weight = 60.1kg  BSA = 1.66m2       Medication: Obinutuzumab (Gazyva)  Dose: 100mg Set Dose Calculated Dose: 100mg Set Dose                            (In mg/m2, AUC, mg/kg)     I confirm that this process was performed independently.

## 2017-09-22 NOTE — PROGRESS NOTES
"Pharmacy Chemotherapy Calculation:    Patient name: Amanuel Zamora  DX: CLL    Cycle: 1      Previous treatment = n/a    Protocol: Chlorambucil + Gazyva  Chlorambucil 0.5 mg/kg PO on Days 1 and 15  Obinutuzumab 100 mg IV on Day 1, followed by  900 mg IV on Day 2, followed by  1000 mg IV on Days 8 and 15, followed by  1000 mg IV on Day 1 of Cycles 2-6  Every 28 days for 6 cycles  NCCN Guidelines for CLL.V.1.2017  Beatriz MUNOZ et al. N Engl J Med. 2014 Mar 20;370(81):1101-10.     Allergies:  Review of patient's allergies indicates not on file.    /89   Pulse 87   Temp 36.7 °C (98 °F)   Resp 18   Ht 1.65 m (5' 4.96\")   Wt 60.1 kg (132 lb 7.9 oz)   SpO2 96%   BMI 22.08 kg/m²   Body surface area is 1.66 meters squared.    Labs 9/21/17  ANC ~31,770  (WBC >440)  PLT = 140k   Hgb = 10.5  Scr =  1.01  Est CrCl ~64 ml/min    LFTs = 15/15/177  (no dose adjustment required)  Tbili = 0.6    9/13/17 Hepatitis panel = negative     Obinutuzumab 100 mg fixed dose   <5% difference, okay to treat with final dose = 100 mg IV on Day 1      Fazal Malone, PharmD      "

## 2017-09-23 ENCOUNTER — OUTPATIENT INFUSION SERVICES (OUTPATIENT)
Dept: ONCOLOGY | Facility: MEDICAL CENTER | Age: 62
End: 2017-09-23
Attending: INTERNAL MEDICINE
Payer: COMMERCIAL

## 2017-09-23 VITALS
WEIGHT: 132.94 LBS | HEART RATE: 87 BPM | OXYGEN SATURATION: 96 % | HEIGHT: 65 IN | DIASTOLIC BLOOD PRESSURE: 72 MMHG | SYSTOLIC BLOOD PRESSURE: 126 MMHG | TEMPERATURE: 98.5 F | RESPIRATION RATE: 18 BRPM | BODY MASS INDEX: 22.15 KG/M2

## 2017-09-23 DIAGNOSIS — C91.10 CLL (CHRONIC LYMPHOCYTIC LEUKEMIA) (HCC): ICD-10-CM

## 2017-09-23 PROCEDURE — 700105 HCHG RX REV CODE 258

## 2017-09-23 PROCEDURE — 700111 HCHG RX REV CODE 636 W/ 250 OVERRIDE (IP): Performed by: INTERNAL MEDICINE

## 2017-09-23 PROCEDURE — A9270 NON-COVERED ITEM OR SERVICE: HCPCS | Performed by: INTERNAL MEDICINE

## 2017-09-23 PROCEDURE — 700102 HCHG RX REV CODE 250 W/ 637 OVERRIDE(OP): Performed by: INTERNAL MEDICINE

## 2017-09-23 PROCEDURE — 700105 HCHG RX REV CODE 258: Performed by: INTERNAL MEDICINE

## 2017-09-23 PROCEDURE — 96415 CHEMO IV INFUSION ADDL HR: CPT

## 2017-09-23 PROCEDURE — 96413 CHEMO IV INFUSION 1 HR: CPT

## 2017-09-23 PROCEDURE — 96375 TX/PRO/DX INJ NEW DRUG ADDON: CPT

## 2017-09-23 RX ORDER — SODIUM CHLORIDE 9 MG/ML
1000 INJECTION, SOLUTION INTRAVENOUS CONTINUOUS
Status: ACTIVE | OUTPATIENT
Start: 2017-09-23 | End: 2017-09-23

## 2017-09-23 RX ORDER — ACETAMINOPHEN 500 MG
1000 TABLET ORAL ONCE
Status: COMPLETED | OUTPATIENT
Start: 2017-09-23 | End: 2017-09-23

## 2017-09-23 RX ADMIN — OBINUTUZUMAB 900 MG: 1000 INJECTION, SOLUTION, CONCENTRATE INTRAVENOUS at 09:30

## 2017-09-23 RX ADMIN — ACETAMINOPHEN 1000 MG: 500 TABLET ORAL at 08:16

## 2017-09-23 RX ADMIN — DIPHENHYDRAMINE HYDROCHLORIDE 25 MG: 50 INJECTION INTRAMUSCULAR; INTRAVENOUS at 08:17

## 2017-09-23 RX ADMIN — DEXAMETHASONE SODIUM PHOSPHATE 20 MG: 4 INJECTION, SOLUTION INTRAMUSCULAR; INTRAVENOUS at 08:32

## 2017-09-23 RX ADMIN — SODIUM CHLORIDE 1000 ML: 9 INJECTION, SOLUTION INTRAVENOUS at 08:10

## 2017-09-23 ASSESSMENT — PAIN SCALES - GENERAL: PAINLEVEL: 1=MINIMAL PAIN

## 2017-09-23 NOTE — PROGRESS NOTES
"Pharmacy Chemotherapy Note    Second Check    Patient Name: Amanuel Zamora  [id number]  Oncologist: Duran    Protocol: OP CLL Obinutuzumab    Chlorambucil:  0.5 mg/kg PO days 1 and 15    Obinutuzumab:   100 mg IV on day 1 cycle 1 followed by  900 mg IV on day 2 of cycle 1, followed by  1000 mg IV on days 8 and 15 of cycle 1, followed by  1000 mg IV on day 1 cycles 2-6    *Dosing Reference*  NCCN guidelines for chronic lymphocytic leukemia/small lymphocytic lymphoma V.1.2017  Beatriz V, et al. N Engl J Med. 2014;370(12):1101-10      Dx: CLL         /73   Pulse 88   Temp 36.5 °C (97.7 °F)   Resp 18   Ht 1.65 m (5' 4.96\")   Wt 60.3 kg (132 lb 15 oz)   SpO2 96%   BMI 22.15 kg/m²  Body surface area is 1.66 meters squared.    9/21/2017:  WBC: 440.0     Plt: 140k   Hgb/Hct: 10.5/40.2     SCr: 1.01mg/dL CrCl: 64 mL/min    Uric Acid: 4.1   K: 4.1  Na: 136          Glu: 161  LFT's: 15/15 TBili = 0.6         Drug Order   (Drug name, dose, route, IV Fluid & volume, frequency, number of doses) Cycle: 1 Day: 2      Previous treatment: 9/22/2017     Medication = Obinutuzumab  Base Dose= 900 mg fixed dose  Calc Dose: Base Dose: no calc. needed  Final Dose = 900 mg  Route = IV  Fluid & Volume =  mL  Admin Duration = Over 4 hours          <5% difference, OK to treat with final dose      By my signature below, I confirm this process was performed independently with the BSA and all final chemotherapy dosing calculations congruent. I have reviewed the above chemotherapy order and that my calculation of the final dose and BSA (when applicable) corroborate those calculations of the  pharmacist. Discrepancies of 5% or greater in the written dose have been addressed and documented within the [facility] Progress notes.    Martinez Frederick, PharmD  "

## 2017-09-23 NOTE — PROGRESS NOTES
Chemotherapy Verification - PRIMARY RN      Height = 165cm  Weight = 60.1kg  BSA = 1.66m2       Medication: Gazyva  Dose: 100mg (set dose)  Calculated Dose: 100mg (set dose)                             (In mg/m2, AUC, mg/kg)       I confirm this process was performed independently with the BSA and all final chemotherapy dosing calculations congruent.  Any discrepancies of 5% or greater have been addressed with the chemotherapy pharmacist. The resolution of the discrepancy has been documented in the EPIC progress notes.

## 2017-09-23 NOTE — PROGRESS NOTES
Pt to infusion center for chemotherapy as treatment for CLL.  Pt oriented to infusion services and plan of care.  Wife, Masha, and son in law, Shaila, present.  Drug information sheets given, all questions answered.  Aide nurse navigator, present during visit as well.  PIV established, brisk blood return noted.  Pt given premeds as ordered.  Pt tolerated hydration and infusion without incident.  On return trip from bathroom, pt felt lightheaded and was diaphoretic.  BP's taken, see flowsheets.  After approximately 5 minutes, pt reported feeling much better.  Pt tolerated remainder of infusion without incident; line flushed clear.  PIV flushed with saline per policy, removed, gauze dressing placed.  Pt left infusion center ambulatory and in good condition with wife and son in law.  Returns tomorrow at 0800, appointment confirmed.

## 2017-09-23 NOTE — PROGRESS NOTES
Pt arrived to [facility], ambulatory, for day 2 Gazyva infusion. Pt voices no complaints since yesterday. 22g PIV established in the R-FA, positive blood return noted. Labs reviewed from day 1, MD aware of WBC count. Pre-medications given with no s/sx of adverse reaction. Gazyva infused with no s/sx of adverse reaction, titrated to 56 mL/hr max per pt request. PIV flushed and removed. Pt left IS with no s/sx of distress. Follow up appointment confirmed.

## 2017-09-23 NOTE — PROGRESS NOTES
"Pharmacy Chemotherapy Calculation:    Patient name: Amanuel Zamora  DX: CLL    Cycle 1, Day 2       Previous treatment: C1D1 = 09/22/17    Protocol: Chlorambucil + Obinutuzumab     *Dosing Reference*  Chlorambucil 0.5 mg/kg PO on Days 1 and 15 - patient taking own   Obinutuzumab 100 mg IV on Day 1, followed by 900 mg IV on Day 2, followed by  1000 mg IV on Days 8 and 15 for cycle 1   Obinutuzumab 1000 mg IV on Day 1 for cycles 2-6  Every 28 days for 6 cycles   NCCN Guidelines for CLL.V.1.2017  Beatriz V, et al. N Engl J Med. 2014 Mar 20;370(12):1101-10.     Allergies:  Review of patient's allergies indicates not on file.    /73   Pulse 88   Temp 36.5 °C (97.7 °F)   Resp 18   Ht 1.65 m (5' 4.96\")   Wt 60.3 kg (132 lb 15 oz)   SpO2 96%   BMI 22.15 kg/m²   Body surface area is 1.66 meters squared.    Labs 9/21/17  ANC ~31,770  (WBC >440)   PLT = 140k    Hgb = 10.5  Scr =  1.01mg/dL  Est CrCl ~64 ml/min    LFTs = 15/15/177  (no dose adjustment required)  Tbili = 0.6     9/13/2017 09:57   Hepatitis B Surface Antigen Negative   Hepatitis B Cors Ab,IgM Negative       Obinutuzumab (Garzyva) 900 mg fixed dose   <5% difference, okay to treat with final dose = 900 mg IV on Day 2      Payal Asencio, PharmD        "

## 2017-09-23 NOTE — PROGRESS NOTES
Chemotherapy Verification - PRIMARY RN      Height = 165 cm  Weight = 60.3 kg  BSA = 1.66 m2       Medication: Gazyva  Dose: 900 mg (set dose)  Calculated Dose: 900 mg                             (In mg/m2, AUC, mg/kg)       I confirm this process was performed independently with the BSA and all final chemotherapy dosing calculations congruent.  Any discrepancies of 5% or greater have been addressed with the chemotherapy pharmacist. The resolution of the discrepancy has been documented in the EPIC progress notes.

## 2017-09-25 ENCOUNTER — HOSPITAL ENCOUNTER (OUTPATIENT)
Dept: LAB | Facility: MEDICAL CENTER | Age: 62
End: 2017-09-25
Attending: INTERNAL MEDICINE

## 2017-09-25 ENCOUNTER — TELEPHONE (OUTPATIENT)
Dept: HEMATOLOGY ONCOLOGY | Facility: MEDICAL CENTER | Age: 62
End: 2017-09-25

## 2017-09-25 ENCOUNTER — APPOINTMENT (OUTPATIENT)
Dept: LAB | Facility: MEDICAL CENTER | Age: 62
End: 2017-09-25
Attending: INTERNAL MEDICINE

## 2017-09-25 LAB
ALBUMIN SERPL BCP-MCNC: 4 G/DL (ref 3.2–4.9)
ALBUMIN/GLOB SERPL: 1.5 G/DL
ALP SERPL-CCNC: 79 U/L (ref 30–99)
ALT SERPL-CCNC: 28 U/L (ref 2–50)
ANION GAP SERPL CALC-SCNC: 12 MMOL/L (ref 0–11.9)
AST SERPL-CCNC: 11 U/L (ref 12–45)
BASOPHILS # BLD AUTO: 0 % (ref 0–1.8)
BASOPHILS # BLD: 0 K/UL (ref 0–0.12)
BILIRUB SERPL-MCNC: 1.1 MG/DL (ref 0.1–1.5)
BUN SERPL-MCNC: 31 MG/DL (ref 8–22)
CALCIUM SERPL-MCNC: 9.4 MG/DL (ref 8.5–10.5)
CHLORIDE SERPL-SCNC: 97 MMOL/L (ref 96–112)
CO2 SERPL-SCNC: 23 MMOL/L (ref 20–33)
CREAT SERPL-MCNC: 1.03 MG/DL (ref 0.5–1.4)
EOSINOPHIL # BLD AUTO: 0 K/UL (ref 0–0.51)
EOSINOPHIL NFR BLD: 0 % (ref 0–6.9)
ERYTHROCYTE [DISTWIDTH] IN BLOOD BY AUTOMATED COUNT: 54.4 FL (ref 35.9–50)
GFR SERPL CREATININE-BSD FRML MDRD: >60 ML/MIN/1.73 M 2
GLOBULIN SER CALC-MCNC: 2.7 G/DL (ref 1.9–3.5)
GLUCOSE SERPL-MCNC: 199 MG/DL (ref 65–99)
HCT VFR BLD AUTO: 41.4 % (ref 42–52)
HGB BLD-MCNC: 13.2 G/DL (ref 14–18)
LYMPHOCYTES # BLD AUTO: 2.4 K/UL (ref 1–4.8)
LYMPHOCYTES NFR BLD: 27 % (ref 22–41)
MANUAL DIFF BLD: NORMAL
MCH RBC QN AUTO: 28.7 PG (ref 27–33)
MCHC RBC AUTO-ENTMCNC: 31.9 G/DL (ref 33.7–35.3)
MCV RBC AUTO: 90 FL (ref 81.4–97.8)
MONOCYTES # BLD AUTO: 0.08 K/UL (ref 0–0.85)
MONOCYTES NFR BLD AUTO: 0.9 % (ref 0–13.4)
MORPHOLOGY BLD-IMP: NORMAL
MYELOCYTES NFR BLD MANUAL: 0.9 %
NEUTROPHILS # BLD AUTO: 6.34 K/UL (ref 1.82–7.42)
NEUTROPHILS NFR BLD: 70.3 % (ref 44–72)
NEUTS BAND NFR BLD MANUAL: 0.9 % (ref 0–10)
NRBC # BLD AUTO: 0.06 K/UL
NRBC BLD AUTO-RTO: 0.7 /100 WBC
OVALOCYTES BLD QL SMEAR: NORMAL
PLATELET # BLD AUTO: 38 K/UL (ref 164–446)
PLATELET BLD QL SMEAR: NORMAL
PLATELETS.RETICULATED NFR BLD AUTO: 5.6 K/UL (ref 0.6–13.1)
PMV BLD AUTO: 10.1 FL (ref 9–12.9)
POIKILOCYTOSIS BLD QL SMEAR: NORMAL
POTASSIUM SERPL-SCNC: 3.9 MMOL/L (ref 3.6–5.5)
PROT SERPL-MCNC: 6.7 G/DL (ref 6–8.2)
RBC # BLD AUTO: 4.6 M/UL (ref 4.7–6.1)
RBC BLD AUTO: PRESENT
SMUDGE CELLS BLD QL SMEAR: NORMAL
SODIUM SERPL-SCNC: 132 MMOL/L (ref 135–145)
URATE SERPL-MCNC: 5.9 MG/DL (ref 2.5–8.3)
WBC # BLD AUTO: 8.9 K/UL (ref 4.8–10.8)

## 2017-09-25 PROCEDURE — 36415 COLL VENOUS BLD VENIPUNCTURE: CPT

## 2017-09-25 PROCEDURE — 85027 COMPLETE CBC AUTOMATED: CPT

## 2017-09-25 PROCEDURE — 85055 RETICULATED PLATELET ASSAY: CPT

## 2017-09-25 PROCEDURE — 84550 ASSAY OF BLOOD/URIC ACID: CPT

## 2017-09-25 PROCEDURE — 85007 BL SMEAR W/DIFF WBC COUNT: CPT

## 2017-09-25 PROCEDURE — 80053 COMPREHEN METABOLIC PANEL: CPT

## 2017-09-26 ENCOUNTER — TELEPHONE (OUTPATIENT)
Dept: HEMATOLOGY ONCOLOGY | Facility: MEDICAL CENTER | Age: 62
End: 2017-09-26

## 2017-09-26 NOTE — TELEPHONE ENCOUNTER
Active symptom call routed to this RN.  Pt's son called reporting a fever x 3 days  (+) chills yesterday, (-) N/V/D, (+) diaphoresis last night.    Current temperature 100.2 degrees F per pt's son. Educated to give 650 mg tylenol Q 6 hours, but to check temperature prior to each dose. OK'd by Dr. Garzon. Also asked son to take Said to have blood work done today. Son is agreeable. Educated to call back tomorrow if symptoms persist and Dr. Garzon will see the patient. If symptoms subside, plan is to see him on Thursday.    Educated son to please call if symptoms persist. This RN will follow-up in AM.

## 2017-09-27 ENCOUNTER — TELEPHONE (OUTPATIENT)
Dept: HEMATOLOGY ONCOLOGY | Facility: MEDICAL CENTER | Age: 62
End: 2017-09-27

## 2017-09-27 ENCOUNTER — HOSPITAL ENCOUNTER (OUTPATIENT)
Facility: MEDICAL CENTER | Age: 62
End: 2017-09-27
Attending: INTERNAL MEDICINE
Payer: COMMERCIAL

## 2017-09-27 ENCOUNTER — NON-PROVIDER VISIT (OUTPATIENT)
Dept: HEMATOLOGY ONCOLOGY | Facility: MEDICAL CENTER | Age: 62
End: 2017-09-27

## 2017-09-27 VITALS
WEIGHT: 134.48 LBS | TEMPERATURE: 98.8 F | DIASTOLIC BLOOD PRESSURE: 72 MMHG | HEART RATE: 94 BPM | BODY MASS INDEX: 22.96 KG/M2 | OXYGEN SATURATION: 97 % | HEIGHT: 64 IN | SYSTOLIC BLOOD PRESSURE: 104 MMHG | RESPIRATION RATE: 18 BRPM

## 2017-09-27 DIAGNOSIS — C91.10 CLL (CHRONIC LYMPHOCYTIC LEUKEMIA) (HCC): ICD-10-CM

## 2017-09-27 LAB
ALBUMIN SERPL BCP-MCNC: 3.4 G/DL (ref 3.2–4.9)
ALBUMIN/GLOB SERPL: 1.3 G/DL
ALP SERPL-CCNC: 70 U/L (ref 30–99)
ALT SERPL-CCNC: 20 U/L (ref 2–50)
ANION GAP SERPL CALC-SCNC: 8 MMOL/L (ref 0–11.9)
AST SERPL-CCNC: 13 U/L (ref 12–45)
BASOPHILS # BLD AUTO: 0.2 % (ref 0–1.8)
BASOPHILS # BLD: 0.01 K/UL (ref 0–0.12)
BILIRUB SERPL-MCNC: 1.4 MG/DL (ref 0.1–1.5)
BUN SERPL-MCNC: 17 MG/DL (ref 8–22)
CALCIUM SERPL-MCNC: 8.9 MG/DL (ref 8.5–10.5)
CHLORIDE SERPL-SCNC: 96 MMOL/L (ref 96–112)
CO2 SERPL-SCNC: 23 MMOL/L (ref 20–33)
CREAT SERPL-MCNC: 0.74 MG/DL (ref 0.5–1.4)
EOSINOPHIL # BLD AUTO: 0.02 K/UL (ref 0–0.51)
EOSINOPHIL NFR BLD: 0.4 % (ref 0–6.9)
ERYTHROCYTE [DISTWIDTH] IN BLOOD BY AUTOMATED COUNT: 48.7 FL (ref 35.9–50)
GFR SERPL CREATININE-BSD FRML MDRD: >60 ML/MIN/1.73 M 2
GLOBULIN SER CALC-MCNC: 2.6 G/DL (ref 1.9–3.5)
GLUCOSE SERPL-MCNC: 294 MG/DL (ref 65–99)
HCT VFR BLD AUTO: 36.2 % (ref 42–52)
HGB BLD-MCNC: 12.3 G/DL (ref 14–18)
IMM GRANULOCYTES # BLD AUTO: 0.04 K/UL (ref 0–0.11)
IMM GRANULOCYTES NFR BLD AUTO: 0.8 % (ref 0–0.9)
LYMPHOCYTES # BLD AUTO: 0.99 K/UL (ref 1–4.8)
LYMPHOCYTES NFR BLD: 20.9 % (ref 22–41)
MCH RBC QN AUTO: 29.1 PG (ref 27–33)
MCHC RBC AUTO-ENTMCNC: 34 G/DL (ref 33.7–35.3)
MCV RBC AUTO: 85.8 FL (ref 81.4–97.8)
MONOCYTES # BLD AUTO: 0.37 K/UL (ref 0–0.85)
MONOCYTES NFR BLD AUTO: 7.8 % (ref 0–13.4)
NEUTROPHILS # BLD AUTO: 3.3 K/UL (ref 1.82–7.42)
NEUTROPHILS NFR BLD: 69.9 % (ref 44–72)
NRBC # BLD AUTO: 0 K/UL
NRBC BLD AUTO-RTO: 0 /100 WBC
PLATELET # BLD AUTO: 15 K/UL (ref 164–446)
PMV BLD AUTO: 12 FL (ref 9–12.9)
POTASSIUM SERPL-SCNC: 4 MMOL/L (ref 3.6–5.5)
PROT SERPL-MCNC: 6 G/DL (ref 6–8.2)
RBC # BLD AUTO: 4.22 M/UL (ref 4.7–6.1)
SODIUM SERPL-SCNC: 127 MMOL/L (ref 135–145)
WBC # BLD AUTO: 4.7 K/UL (ref 4.8–10.8)

## 2017-09-27 PROCEDURE — 85025 COMPLETE CBC W/AUTO DIFF WBC: CPT

## 2017-09-27 PROCEDURE — 80053 COMPREHEN METABOLIC PANEL: CPT

## 2017-09-27 ASSESSMENT — PAIN SCALES - GENERAL: PAINLEVEL: 4=SLIGHT-MODERATE PAIN

## 2017-09-27 NOTE — TELEPHONE ENCOUNTER
Called to follow up with Said regarding fevers from yesterday. Son still reports fevers of 100.0 degrees f despite tylenol. Placed pt on this RN's schedule for vitals check and assessment. Plan is to draw stat CBC and CMP upon arrival. Dr. Garzon aware of pt condition. Will update after assessment is complete.

## 2017-09-27 NOTE — PROGRESS NOTES
Pt here for RN visit accompanied by son in law. Upon initial vital signs pt is afebrile (98.8 f) last tylenol dose was last night at 2200. Denies chills and diaphoresis with this visit.      HR-107  BP 98/74  Dr. Garzon made aware of change in vitals from baseline. Orders received to infuse 1L NS.  Lung sounds clear throughout  Pt denies cardiac hx. No echo seen on file.      22g started on R AC, flushing without difficulty  CBC, CMP sent to lab  NS infusing without difficulty  Educated pt to call if IV site begins to hurt    PIV d/c'd tip intact  Lung sounds clear  See flowsheets for vitals  Plan for Dr. Garzon to see pt tomorrow    CBC results back  Plts-15 (headache)  Dr. Garzon aware  Plan is to follow-up with pt tomorrow with labs  Educated pt on bleeding precautions  Pt verbally understands

## 2017-09-28 ENCOUNTER — NON-PROVIDER VISIT (OUTPATIENT)
Dept: HEMATOLOGY ONCOLOGY | Facility: MEDICAL CENTER | Age: 62
End: 2017-09-28

## 2017-09-28 ENCOUNTER — HOSPITAL ENCOUNTER (OUTPATIENT)
Facility: MEDICAL CENTER | Age: 62
End: 2017-09-28
Attending: INTERNAL MEDICINE
Payer: COMMERCIAL

## 2017-09-28 ENCOUNTER — OFFICE VISIT (OUTPATIENT)
Dept: HEMATOLOGY ONCOLOGY | Facility: MEDICAL CENTER | Age: 62
End: 2017-09-28

## 2017-09-28 VITALS
BODY MASS INDEX: 22.36 KG/M2 | WEIGHT: 130.95 LBS | RESPIRATION RATE: 16 BRPM | SYSTOLIC BLOOD PRESSURE: 128 MMHG | DIASTOLIC BLOOD PRESSURE: 62 MMHG | HEART RATE: 101 BPM | HEIGHT: 64 IN | TEMPERATURE: 98 F | OXYGEN SATURATION: 97 %

## 2017-09-28 VITALS
WEIGHT: 130 LBS | TEMPERATURE: 98 F | DIASTOLIC BLOOD PRESSURE: 62 MMHG | RESPIRATION RATE: 16 BRPM | SYSTOLIC BLOOD PRESSURE: 128 MMHG | HEART RATE: 98 BPM | OXYGEN SATURATION: 90 % | HEIGHT: 64 IN | BODY MASS INDEX: 22.2 KG/M2

## 2017-09-28 DIAGNOSIS — C91.10 CLL (CHRONIC LYMPHOCYTIC LEUKEMIA) (HCC): ICD-10-CM

## 2017-09-28 LAB
ALBUMIN SERPL BCP-MCNC: 3.2 G/DL (ref 3.2–4.9)
ALBUMIN/GLOB SERPL: 1.2 G/DL
ALP SERPL-CCNC: 95 U/L (ref 30–99)
ALT SERPL-CCNC: 24 U/L (ref 2–50)
ANION GAP SERPL CALC-SCNC: 10 MMOL/L (ref 0–11.9)
ANISOCYTOSIS BLD QL SMEAR: ABNORMAL
AST SERPL-CCNC: 16 U/L (ref 12–45)
BASOPHILS # BLD AUTO: 0.2 % (ref 0–1.8)
BASOPHILS # BLD: 0.01 K/UL (ref 0–0.12)
BILIRUB SERPL-MCNC: 1.2 MG/DL (ref 0.1–1.5)
BUN SERPL-MCNC: 16 MG/DL (ref 8–22)
CALCIUM SERPL-MCNC: 8.4 MG/DL (ref 8.5–10.5)
CHLORIDE SERPL-SCNC: 96 MMOL/L (ref 96–112)
CO2 SERPL-SCNC: 20 MMOL/L (ref 20–33)
COMMENT 1642: NORMAL
CREAT SERPL-MCNC: 0.68 MG/DL (ref 0.5–1.4)
EOSINOPHIL # BLD AUTO: 0.02 K/UL (ref 0–0.51)
EOSINOPHIL NFR BLD: 0.5 % (ref 0–6.9)
ERYTHROCYTE [DISTWIDTH] IN BLOOD BY AUTOMATED COUNT: 49.3 FL (ref 35.9–50)
GFR SERPL CREATININE-BSD FRML MDRD: >60 ML/MIN/1.73 M 2
GLOBULIN SER CALC-MCNC: 2.6 G/DL (ref 1.9–3.5)
GLUCOSE SERPL-MCNC: 305 MG/DL (ref 65–99)
HCT VFR BLD AUTO: 34 % (ref 42–52)
HGB BLD-MCNC: 11.5 G/DL (ref 14–18)
IMM GRANULOCYTES # BLD AUTO: 0.03 K/UL (ref 0–0.11)
IMM GRANULOCYTES NFR BLD AUTO: 0.7 % (ref 0–0.9)
LYMPHOCYTES # BLD AUTO: 0.82 K/UL (ref 1–4.8)
LYMPHOCYTES NFR BLD: 19.4 % (ref 22–41)
MACROCYTES BLD QL SMEAR: ABNORMAL
MCH RBC QN AUTO: 29 PG (ref 27–33)
MCHC RBC AUTO-ENTMCNC: 33.8 G/DL (ref 33.7–35.3)
MCV RBC AUTO: 85.6 FL (ref 81.4–97.8)
MICROCYTES BLD QL SMEAR: ABNORMAL
MONOCYTES # BLD AUTO: 0.39 K/UL (ref 0–0.85)
MONOCYTES NFR BLD AUTO: 9.2 % (ref 0–13.4)
MORPHOLOGY BLD-IMP: NORMAL
NEUTROPHILS # BLD AUTO: 2.96 K/UL (ref 1.82–7.42)
NEUTROPHILS NFR BLD: 70 % (ref 44–72)
NRBC # BLD AUTO: 0 K/UL
NRBC BLD AUTO-RTO: 0 /100 WBC
PLATELET # BLD AUTO: 14 K/UL (ref 164–446)
PLATELET BLD QL SMEAR: NORMAL
PLATELETS.RETICULATED NFR BLD AUTO: 10.7 K/UL (ref 0.6–13.1)
PMV BLD AUTO: 13.5 FL (ref 9–12.9)
POTASSIUM SERPL-SCNC: 3.9 MMOL/L (ref 3.6–5.5)
PROT SERPL-MCNC: 5.8 G/DL (ref 6–8.2)
RBC # BLD AUTO: 3.97 M/UL (ref 4.7–6.1)
RBC BLD AUTO: PRESENT
SODIUM SERPL-SCNC: 126 MMOL/L (ref 135–145)
WBC # BLD AUTO: 4.2 K/UL (ref 4.8–10.8)

## 2017-09-28 PROCEDURE — 99213 OFFICE O/P EST LOW 20 MIN: CPT | Performed by: INTERNAL MEDICINE

## 2017-09-28 PROCEDURE — 36415 COLL VENOUS BLD VENIPUNCTURE: CPT | Performed by: INTERNAL MEDICINE

## 2017-09-28 PROCEDURE — 85025 COMPLETE CBC W/AUTO DIFF WBC: CPT

## 2017-09-28 PROCEDURE — 80053 COMPREHEN METABOLIC PANEL: CPT

## 2017-09-28 PROCEDURE — 85055 RETICULATED PLATELET ASSAY: CPT

## 2017-09-28 ASSESSMENT — PAIN SCALES - GENERAL
PAINLEVEL: NO PAIN
PAINLEVEL: NO PAIN

## 2017-09-28 NOTE — PROGRESS NOTES
Consult Note: Oncology    Date of consultation: 9/28/2017     Referring provider: The patient is here by the kind referral of No ref. provider found    Reason for consultation:   CC: Chronic lymphocytic leukemia     History of presenting illness:   February 28, 2013 WBC 7.8  March 4, 2016. WBC 60  March 16, 2017 WBC 42.4  May 22, 2017   July 21, 2017 patient was seen by oncologist Dr. Mendez Mariee at [facility] hematology clinic. He said that he has had elevated white cell count for a long time and has been in watch and wait pattern. He was recommended treatment based on final FISH results for B cell lymphoproliferative disorder.  July 31, 2017 . Absolute lymphocyte count 355  August 4, 2017. 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 13/p53 mutation. CD5 positive, CD19 positive CD20 dim partial 23 positive and Kappa +, no expression of , CD10, CD20 8.  August 28, 2017 . Absolute lymphocyte count 401. Patient was seen in follow-up at [facility]  September 12, 2017. Patient started on high-dose dexamethasone  September 23, 2017. Patient received cycle 1 day 1 of chlorambucil and Obinutuzumab  September 29, 2017 cycle 1 day 8 of Obinutuzumab held for thrombocytopenia.      All relevant medical records available in [facility] were reviewed and are summarized above.    Amanuel Zamora  is a 62 y.o. year old male who is visiting family in the [place] was seen in clinic at [facility] for leukocytosis. Workup showed he has CLL with 17 P deletion. The patient was found to have a rapid doubling time and in combination with 17 P deletion he was recommended to start therapy. However he does not have health insurance and is being out-of-pocket. He also has limited time in the [place] and was not able to start this treatment at [facility]. The patient was referred to for [facility] cancer Gol[facility] for insurance reasons and is here to start  treatment.    Interval istory  Said Noah is a 62-year-old gentleman who is here to follow-up with his wife and son-in-law. Denies any acute complaints, he was having low-grade fevers which have improved with Tylenol. No other signs or symptoms of infection. Specifically denies any shortness of breath, cough, phlegm no burning or difficulty walking urinating. No skin issues    Past Medical History:    Past Medical History:   Diagnosis Date   • Diabetes mellitus (CMS-HCC)          Allergies:  Review of patient's allergies indicates not on file.    Medications:    Current Outpatient Prescriptions   Medication Sig Dispense Refill   • ondansetron (ZOFRAN) 4 MG Tab tablet Take 1 Tab by mouth every four hours as needed for Nausea/Vomiting (for nausea, vomiting). 30 Tab 6   • prochlorperazine (COMPAZINE) 10 MG Tab Take 1 Tab by mouth every 6 hours as needed (for nausea, vomiting). 30 Tab 6   • aspirin EC (ECOTRIN) 81 MG Tablet Delayed Response Take 81 mg by mouth every day.     • Hyaluronate Sodium 0.2 % Solution by Apply externally route.     • glimepiride (AMARYL) 2 MG Tab Take 2 mg by mouth every morning.     • metformin (GLUCOPHAGE) 1000 MG tablet Take 1 Tab by mouth 2 times a day, with meals. 180 Tab 3   • chlorambucil (LEUKERAN) 2 MG Tab Take 6 Tabs by mouth every 14 days. 12 Tab 0   • acyclovir (ZOVIRAX) 400 MG tablet Take 1 Tab by mouth 2 times a day. 30 Tab 5   • allopurinol (ZYLOPRIM) 100 MG Tab Take 3 Tabs by mouth 2 times a day for 30 days. 30 Tab 0     No current facility-administered medications for this visit.        Social History:     Social History     Social History   • Marital status:      Spouse name: N/A   • Number of children: N/A   • Years of education: N/A     Occupational History   • retired       Social History Main Topics   • Smoking status: Never Smoker   • Smokeless tobacco: Never Used   • Alcohol use No   • Drug use: No   • Sexual activity: Yes     Partners: Female     Other  "Topics Concern   • Not on file     Social History Narrative   • No narrative on file       Family History:     Family History   Problem Relation Age of Onset   • No Known Problems Mother    • No Known Problems Father        Review of Systems:  Constitutional: No fever, chills, weight loss, malaise/fatigue.      All other review of systems are negative except what was mentioned above in the HPI.      Physical Exam:  Vitals:   /62   Pulse 98   Temp 36.7 °C (98 °F)   Resp 16   Ht 1.626 m (5' 4.02\")   Wt 59 kg (130 lb)   SpO2 90%   BMI 22.30 kg/m²     ECOG performance status is 1 [Restricted in physically strenuous activity but ambulatory and able to carry out work of a light or sedentary nature, e.g., light house work, office work]  General: Not in acute distress, emaciated with temporal wasting  HEENT: No pallor, icterus. Oropharynx clear.   Neck: Supple, no palpable masses.  Lymph nodes: No palpable cervical, supraclavicular, axillary or inguinal lymphadenopathy.    CVS: regular rate and rhythm, no rubs or gallops  RESP: Clear to auscultate bilaterally, no wheezing or crackles.   ABD: Soft,  non distended, positive bowel sounds, palpable splenomegaly  EXT: No edema or cyanosis  CNS: Alert and oriented x3, No focal deficits.  Skin- No rash      Labs:   Recent Labs      09/27/17   1056  09/28/17   1020   WBC  4.7*  4.2*   RBC  4.22*  3.97*   HEMOGLOBIN  12.3*  11.5*   HEMATOCRIT  36.2*  34.0*   MCV  85.8  85.6   MCH  29.1  29.0   MCHC  34.0  33.8   RDW  48.7  49.3   PLATELETCT  15*  14*   MPV  12.0  13.5*         Imaging:    Assessment and Plan:  -CLL  -Zamudio stage II with splenomegaly  -17p deletion/TP53 mutation  -This is a very unusual situation with patient is here in the [place] until October 10.  -Patient needs to start treatment for his B-cell lymphoproliferative disorder.  -Patient is poor risk because of 17p deletion  -Ideally the patient needs to be started on first-line chemoimmunotherapy " followed by maintenance therapy or started on Ibrutininb. However the patient's social situation is that he is paying out of pocket for everything and cannot afford expensive medical care. He is also leaving the [place] in about 6 weeks.  -The patient and family are requesting that treatment be started now which he can continue later back home or in [place].  -Acyclovir for viral prophylaxis.  -September 12, 2017. Patient started on high-dose dexamethasone  -September 23, 2017. Patient received cycle 1 day 1 of chlorambucil and Obinutuzumab  -September 29, 2017 cycle 1 day 8 of Obinutuzumab held for thrombocytopenia.  -No evidence of tumor lysis at this time   -Continue allopurinol 100 mg daily      -All medical records available in [facility] were reviewed and are summarized above.  -All labs and/or imaging studies mentioned in the note above were reviewed with and explained to the patient as a pertain to medical decision making.      He agreed and verbalized his agreement and understanding with the current plan.  I answered all questions and concerns he has at this time.      Please note that this dictation was created using voice recognition software. I have made every reasonable attempt to correct obvious errors, but I expect that there are errors of grammar and possibly content that I did not discover before finalizing the note.      SIGNATURES:  Shaila Garzon

## 2017-09-28 NOTE — PROGRESS NOTES
Amanuel Zamora is a 62 y.o. male here for a non-provider visit for: Lab Draws  on 9/28/2017 at 11:44 AM    Procedure Performed: Venipuncture     Anatomical site: Right Antecubital Area (AC)    Equipment used: 21g    Labs drawn: CBC w/diff and CMP    Ordering Provider: Duran Singer By: Cathy Blanc, Xavier Ass't  No Complications

## 2017-09-29 ENCOUNTER — APPOINTMENT (OUTPATIENT)
Dept: ONCOLOGY | Facility: MEDICAL CENTER | Age: 62
End: 2017-09-29
Attending: INTERNAL MEDICINE
Payer: COMMERCIAL

## 2017-10-02 ENCOUNTER — HOSPITAL ENCOUNTER (OUTPATIENT)
Dept: LAB | Facility: MEDICAL CENTER | Age: 62
End: 2017-10-02
Attending: INTERNAL MEDICINE

## 2017-10-02 DIAGNOSIS — C91.10 CLL (CHRONIC LYMPHOCYTIC LEUKEMIA) (HCC): ICD-10-CM

## 2017-10-02 LAB
BASOPHILS # BLD AUTO: 0 % (ref 0–1.8)
BASOPHILS # BLD: 0 K/UL (ref 0–0.12)
EOSINOPHIL # BLD AUTO: 0.02 K/UL (ref 0–0.51)
EOSINOPHIL NFR BLD: 0.5 % (ref 0–6.9)
ERYTHROCYTE [DISTWIDTH] IN BLOOD BY AUTOMATED COUNT: 57 FL (ref 35.9–50)
HCT VFR BLD AUTO: 32.4 % (ref 42–52)
HGB BLD-MCNC: 10.5 G/DL (ref 14–18)
IMM GRANULOCYTES # BLD AUTO: 0.01 K/UL (ref 0–0.11)
IMM GRANULOCYTES NFR BLD AUTO: 0.3 % (ref 0–0.9)
LYMPHOCYTES # BLD AUTO: 1.09 K/UL (ref 1–4.8)
LYMPHOCYTES NFR BLD: 27.4 % (ref 22–41)
MCH RBC QN AUTO: 29.6 PG (ref 27–33)
MCHC RBC AUTO-ENTMCNC: 32.4 G/DL (ref 33.7–35.3)
MCV RBC AUTO: 91.3 FL (ref 81.4–97.8)
MONOCYTES # BLD AUTO: 0.22 K/UL (ref 0–0.85)
MONOCYTES NFR BLD AUTO: 5.5 % (ref 0–13.4)
NEUTROPHILS # BLD AUTO: 2.64 K/UL (ref 1.82–7.42)
NEUTROPHILS NFR BLD: 66.3 % (ref 44–72)
NRBC # BLD AUTO: 0.02 K/UL
NRBC BLD AUTO-RTO: 0.5 /100 WBC
PLATELET # BLD AUTO: 79 K/UL (ref 164–446)
PMV BLD AUTO: 11.7 FL (ref 9–12.9)
RBC # BLD AUTO: 3.55 M/UL (ref 4.7–6.1)
WBC # BLD AUTO: 4 K/UL (ref 4.8–10.8)

## 2017-10-02 PROCEDURE — 36415 COLL VENOUS BLD VENIPUNCTURE: CPT

## 2017-10-02 PROCEDURE — 85025 COMPLETE CBC W/AUTO DIFF WBC: CPT

## 2017-10-03 ENCOUNTER — TELEPHONE (OUTPATIENT)
Dept: HEMATOLOGY ONCOLOGY | Facility: MEDICAL CENTER | Age: 62
End: 2017-10-03

## 2017-10-03 ENCOUNTER — PATIENT MESSAGE (OUTPATIENT)
Dept: HEMATOLOGY ONCOLOGY | Facility: MEDICAL CENTER | Age: 62
End: 2017-10-03

## 2017-10-04 DIAGNOSIS — C91.10 CLL (CHRONIC LYMPHOCYTIC LEUKEMIA) (HCC): ICD-10-CM

## 2017-10-05 ENCOUNTER — OFFICE VISIT (OUTPATIENT)
Dept: HEMATOLOGY ONCOLOGY | Facility: MEDICAL CENTER | Age: 62
End: 2017-10-05

## 2017-10-05 ENCOUNTER — HOSPITAL ENCOUNTER (OUTPATIENT)
Facility: MEDICAL CENTER | Age: 62
End: 2017-10-05
Attending: INTERNAL MEDICINE
Payer: COMMERCIAL

## 2017-10-05 ENCOUNTER — NON-PROVIDER VISIT (OUTPATIENT)
Dept: HEMATOLOGY ONCOLOGY | Facility: MEDICAL CENTER | Age: 62
End: 2017-10-05

## 2017-10-05 VITALS
SYSTOLIC BLOOD PRESSURE: 116 MMHG | HEART RATE: 107 BPM | DIASTOLIC BLOOD PRESSURE: 60 MMHG | TEMPERATURE: 97.9 F | OXYGEN SATURATION: 98 % | RESPIRATION RATE: 16 BRPM | BODY MASS INDEX: 21.89 KG/M2 | HEIGHT: 64 IN | WEIGHT: 128.2 LBS

## 2017-10-05 VITALS
HEIGHT: 64 IN | WEIGHT: 128.2 LBS | HEART RATE: 107 BPM | TEMPERATURE: 97.9 F | SYSTOLIC BLOOD PRESSURE: 116 MMHG | DIASTOLIC BLOOD PRESSURE: 60 MMHG | OXYGEN SATURATION: 98 % | BODY MASS INDEX: 21.89 KG/M2 | RESPIRATION RATE: 16 BRPM

## 2017-10-05 DIAGNOSIS — C91.10 CLL (CHRONIC LYMPHOCYTIC LEUKEMIA) (HCC): ICD-10-CM

## 2017-10-05 LAB
ALBUMIN SERPL BCP-MCNC: 3.4 G/DL (ref 3.2–4.9)
ALBUMIN/GLOB SERPL: 1.3 G/DL
ALP SERPL-CCNC: 64 U/L (ref 30–99)
ALT SERPL-CCNC: 18 U/L (ref 2–50)
ANION GAP SERPL CALC-SCNC: 7 MMOL/L (ref 0–11.9)
AST SERPL-CCNC: 11 U/L (ref 12–45)
BASOPHILS # BLD AUTO: 0.2 % (ref 0–1.8)
BASOPHILS # BLD: 0.01 K/UL (ref 0–0.12)
BILIRUB SERPL-MCNC: 0.7 MG/DL (ref 0.1–1.5)
BUN SERPL-MCNC: 15 MG/DL (ref 8–22)
CALCIUM SERPL-MCNC: 8.8 MG/DL (ref 8.5–10.5)
CHLORIDE SERPL-SCNC: 99 MMOL/L (ref 96–112)
CO2 SERPL-SCNC: 26 MMOL/L (ref 20–33)
CREAT SERPL-MCNC: 0.77 MG/DL (ref 0.5–1.4)
EOSINOPHIL # BLD AUTO: 0.03 K/UL (ref 0–0.51)
EOSINOPHIL NFR BLD: 0.7 % (ref 0–6.9)
ERYTHROCYTE [DISTWIDTH] IN BLOOD BY AUTOMATED COUNT: 51.8 FL (ref 35.9–50)
GFR SERPL CREATININE-BSD FRML MDRD: >60 ML/MIN/1.73 M 2
GLOBULIN SER CALC-MCNC: 2.6 G/DL (ref 1.9–3.5)
GLUCOSE SERPL-MCNC: 326 MG/DL (ref 65–99)
HCT VFR BLD AUTO: 33.1 % (ref 42–52)
HGB BLD-MCNC: 10.9 G/DL (ref 14–18)
IMM GRANULOCYTES # BLD AUTO: 0.01 K/UL (ref 0–0.11)
IMM GRANULOCYTES NFR BLD AUTO: 0.2 % (ref 0–0.9)
LYMPHOCYTES # BLD AUTO: 1.76 K/UL (ref 1–4.8)
LYMPHOCYTES NFR BLD: 40.6 % (ref 22–41)
MCH RBC QN AUTO: 29.4 PG (ref 27–33)
MCHC RBC AUTO-ENTMCNC: 32.9 G/DL (ref 33.7–35.3)
MCV RBC AUTO: 89.2 FL (ref 81.4–97.8)
MONOCYTES # BLD AUTO: 0.27 K/UL (ref 0–0.85)
MONOCYTES NFR BLD AUTO: 6.2 % (ref 0–13.4)
NEUTROPHILS # BLD AUTO: 2.26 K/UL (ref 1.82–7.42)
NEUTROPHILS NFR BLD: 52.1 % (ref 44–72)
NRBC # BLD AUTO: 0 K/UL
NRBC BLD AUTO-RTO: 0 /100 WBC
PLATELET # BLD AUTO: 158 K/UL (ref 164–446)
PMV BLD AUTO: 10.8 FL (ref 9–12.9)
POTASSIUM SERPL-SCNC: 4 MMOL/L (ref 3.6–5.5)
PROT SERPL-MCNC: 6 G/DL (ref 6–8.2)
RBC # BLD AUTO: 3.71 M/UL (ref 4.7–6.1)
SODIUM SERPL-SCNC: 132 MMOL/L (ref 135–145)
URATE SERPL-MCNC: 3.3 MG/DL (ref 2.5–8.3)
WBC # BLD AUTO: 4.3 K/UL (ref 4.8–10.8)

## 2017-10-05 PROCEDURE — 36592 COLLECT BLOOD FROM PICC: CPT | Performed by: INTERNAL MEDICINE

## 2017-10-05 PROCEDURE — 99213 OFFICE O/P EST LOW 20 MIN: CPT | Performed by: INTERNAL MEDICINE

## 2017-10-05 PROCEDURE — 80053 COMPREHEN METABOLIC PANEL: CPT

## 2017-10-05 PROCEDURE — 85025 COMPLETE CBC W/AUTO DIFF WBC: CPT

## 2017-10-05 PROCEDURE — 84550 ASSAY OF BLOOD/URIC ACID: CPT

## 2017-10-05 RX ORDER — METFORMIN HYDROCHLORIDE 500 MG/1
500 TABLET, EXTENDED RELEASE ORAL DAILY
COMMUNITY

## 2017-10-05 ASSESSMENT — PAIN SCALES - GENERAL
PAINLEVEL: NO PAIN
PAINLEVEL: NO PAIN

## 2017-10-05 NOTE — PROGRESS NOTES
Consult Note: Oncology    Date of consultation: 10/5/2017     Referring provider: The patient is here by the kind referral of No ref. provider found    Reason for consultation:   CC: Chronic lymphocytic leukemia     History of presenting illness:   February 28, 2013 WBC 7.8  March 4, 2016. WBC 60  March 16, 2017 WBC 42.4  May 22, 2017   July 21, 2017 patient was seen by oncologist Dr. Mendez Mariee at [facility] hematology clinic. He said that he has had elevated white cell count for a long time and has been in watch and wait pattern. He was recommended treatment based on final FISH results for B cell lymphoproliferative disorder.  July 31, 2017 . Absolute lymphocyte count 355  August 4, 2017. 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 13/p53 mutation. CD5 positive, CD19 positive CD20 dim partial 23 positive and Kappa +, no expression of , CD10, CD20 8.  August 28, 2017 . Absolute lymphocyte count 401. Patient was seen in follow-up at [facility]  September 12, 2017. Patient started on high-dose dexamethasone  September 23, 2017. Patient received cycle 1 day 1 of chlorambucil and Obinutuzumab  September 29, 2017 cycle 1 day 8 of Obinutuzumab held for thrombocytopenia.  October 6, 2017. Plan for cycle 1 day 15 of chlorambucil and Obinutuzumab    All relevant medical records available in [facility] were reviewed and are summarized above.    Amanuel Zamora  is a 62 y.o. year old male who is visiting family in the [place] was seen in clinic at [facility] for leukocytosis. Workup showed he has CLL with 17 P deletion. The patient was found to have a rapid doubling time and in combination with 17 P deletion he was recommended to start therapy. However he does not have health insurance and is being out-of-pocket. He also has limited time in the [place] and was not able to start this treatment at [facility]. The patient was referred  to for [facility] for insurance reasons and is here to start treatment.    Interval istory  Interval History:  Initially seen in our clinic on September 12, 2017  Said Noah is a 62 y.o. male who presents for follow-up. Denies any acute complaints, states he has been eating better.        Past Medical History:    Past Medical History:   Diagnosis Date   • Diabetes mellitus (CMS-HCC)          Allergies:  Review of patient's allergies indicates not on file.    Medications:    Current Outpatient Prescriptions   Medication Sig Dispense Refill   • metformin ER (GLUCOPHAGE XR) 500 MG TABLET SR 24 HR Take 500 mg by mouth every day.     • ondansetron (ZOFRAN) 4 MG Tab tablet Take 1 Tab by mouth every four hours as needed for Nausea/Vomiting (for nausea, vomiting). 30 Tab 6   • prochlorperazine (COMPAZINE) 10 MG Tab Take 1 Tab by mouth every 6 hours as needed (for nausea, vomiting). 30 Tab 6   • aspirin EC (ECOTRIN) 81 MG Tablet Delayed Response Take 81 mg by mouth every day.     • Hyaluronate Sodium 0.2 % Solution by Apply externally route.     • glimepiride (AMARYL) 2 MG Tab Take 2 mg by mouth every morning.     • metformin (GLUCOPHAGE) 1000 MG tablet Take 1 Tab by mouth 2 times a day, with meals. 180 Tab 3   • chlorambucil (LEUKERAN) 2 MG Tab Take 6 Tabs by mouth every 14 days. 12 Tab 0   • acyclovir (ZOVIRAX) 400 MG tablet Take 1 Tab by mouth 2 times a day. 30 Tab 5   • allopurinol (ZYLOPRIM) 100 MG Tab Take 3 Tabs by mouth 2 times a day for 30 days. 30 Tab 0     No current facility-administered medications for this visit.        Social History:     Social History     Social History   • Marital status:      Spouse name: N/A   • Number of children: N/A   • Years of education: N/A     Occupational History   • retired       Social History Main Topics   • Smoking status: Never Smoker   • Smokeless tobacco: Never Used   • Alcohol use No   • Drug use: No   • Sexual activity: Yes     Partners: Female  "    Other Topics Concern   • Not on file     Social History Narrative   • No narrative on file       Family History:     Family History   Problem Relation Age of Onset   • No Known Problems Mother    • No Known Problems Father        Review of Systems:  Constitutional: No fever, chills, weight loss, malaise/fatigue.      All other review of systems are negative except what was mentioned above in the HPI.      Physical Exam:  Vitals:   /60   Pulse (!) 107   Temp 36.6 °C (97.9 °F)   Resp 16   Ht 1.626 m (5' 4.02\")   Wt 58.2 kg (128 lb 3.2 oz)   SpO2 98%   BMI 21.99 kg/m²     ECOG performance status is 1 [Restricted in physically strenuous activity but ambulatory and able to carry out work of a light or sedentary nature, e.g., light house work, office work]  General: Not in acute distress, emaciated with temporal wasting  HEENT: No pallor, icterus. Oropharynx clear.   Neck: Supple, no palpable masses.  Lymph nodes: No palpable cervical, supraclavicular, axillary or inguinal lymphadenopathy.    CVS: regular rate and rhythm, no rubs or gallops  RESP: Clear to auscultate bilaterally, no wheezing or crackles.   ABD: Soft,  non distended, positive bowel sounds, palpable splenomegaly  EXT: No edema or cyanosis  CNS: Alert and oriented x3, No focal deficits.  Skin- No rash      Labs:   Recent Labs      10/05/17   0952   WBC  4.3*   RBC  3.71*   HEMOGLOBIN  10.9*   HEMATOCRIT  33.1*   MCV  89.2   MCH  29.4   MCHC  32.9*   RDW  51.8*   PLATELETCT  158*   MPV  10.8         Imaging:    Assessment and Plan:  -CLL  -Zamudio stage II with splenomegaly  -17p deletion/TP53 mutation  -This is a very unusual situation. The patient 1st presented stating he can stay in the [place] until October 10. However today he told me that mistake has been made and he can stay until January 2018.  -Patient is poor risk because of 17p deletion  -Ideally the patient needs to be started on first-line chemoimmunotherapy followed by " maintenance therapy or started on Ibrutininb. However the patient's social situation is that he is paying out of pocket for everything and cannot afford expensive medical care.   -Acyclovir for viral prophylaxis.  -September 12, 2017. Patient started on high-dose dexamethasone  -September 23, 2017. Patient received cycle 1 day 1 of chlorambucil and Obinutuzumab  -September 29, 2017 cycle 1 day 8 of Obinutuzumab held for thrombocytopenia.  -Patient plan for chlorambucil and Obinutuzumab in the morning cycle 1 day 15.  -Follow-up for cycle 2.  -Check labs weekly    -All medical records available in [facility] were reviewed and are summarized above.  -All labs and/or imaging studies mentioned in the note above were reviewed with and explained to the patient as a pertain to medical decision making.      He agreed and verbalized his agreement and understanding with the current plan.  I answered all questions and concerns he has at this time.      Please note that this dictation was created using voice recognition software. I have made every reasonable attempt to correct obvious errors, but I expect that there are errors of grammar and possibly content that I did not discover before finalizing the note.      SIGNATURES:  Shaila Garzon

## 2017-10-05 NOTE — NON-PROVIDER
Amanuel Zamora is a 62 y.o. male here for a non-provider visit for: Lab draw on 10/5/2017 at 10 AM    Procedure Performed: Venipuncture     Anatomical site: Left Antecubital Area (AC)    Equipment used: 23g Butterfly    Labs drawn: CBC w/diff, CMP and uric acid    Ordering Provider: Dr. LOUIS Garzon    Ravi By: Damien Buckely R.N.     Patient tolerated lab draw with no s/s of distress.

## 2017-10-06 ENCOUNTER — PATIENT MESSAGE (OUTPATIENT)
Dept: HEMATOLOGY ONCOLOGY | Facility: MEDICAL CENTER | Age: 62
End: 2017-10-06

## 2017-10-06 ENCOUNTER — APPOINTMENT (OUTPATIENT)
Dept: ONCOLOGY | Facility: MEDICAL CENTER | Age: 62
End: 2017-10-06
Attending: INTERNAL MEDICINE
Payer: COMMERCIAL

## 2017-10-06 RX ORDER — SODIUM CHLORIDE 9 MG/ML
1000 INJECTION, SOLUTION INTRAVENOUS CONTINUOUS
Status: CANCELLED | OUTPATIENT
Start: 2017-10-10

## 2017-10-06 NOTE — TELEPHONE ENCOUNTER
RN notified by Nate BYRNES, that pt's son Ana was on the phone and states the pt has had chest pain and pulse over 100 since yesterday.  MA states pt's spoke with patient's son, Ana, also sent an email to medical oncology stating the pt had some pain in his heart and some irregular pulses around 3:00 am. RN advised pt's son to take patient to the ER immediately for workup.  Informed him our office will reschedule chemotherapy.  He agreed with POC and hung up the phone.

## 2017-10-10 ENCOUNTER — OUTPATIENT INFUSION SERVICES (OUTPATIENT)
Dept: ONCOLOGY | Facility: MEDICAL CENTER | Age: 62
End: 2017-10-10
Attending: INTERNAL MEDICINE
Payer: COMMERCIAL

## 2017-10-10 VITALS
RESPIRATION RATE: 18 BRPM | TEMPERATURE: 98.6 F | BODY MASS INDEX: 21.05 KG/M2 | HEIGHT: 65 IN | SYSTOLIC BLOOD PRESSURE: 124 MMHG | OXYGEN SATURATION: 98 % | DIASTOLIC BLOOD PRESSURE: 64 MMHG | HEART RATE: 99 BPM | WEIGHT: 126.32 LBS

## 2017-10-10 DIAGNOSIS — C91.10 LEUKEMIA, LYMPHOCYTIC, CHRONIC (HCC): ICD-10-CM

## 2017-10-10 DIAGNOSIS — C91.10 CLL (CHRONIC LYMPHOCYTIC LEUKEMIA) (HCC): ICD-10-CM

## 2017-10-10 LAB
ALBUMIN SERPL BCP-MCNC: 3.6 G/DL (ref 3.2–4.9)
ALBUMIN/GLOB SERPL: 1.3 G/DL
ALP SERPL-CCNC: 55 U/L (ref 30–99)
ALT SERPL-CCNC: 10 U/L (ref 2–50)
ANION GAP SERPL CALC-SCNC: 10 MMOL/L (ref 0–11.9)
AST SERPL-CCNC: 12 U/L (ref 12–45)
BASOPHILS # BLD AUTO: 0.6 % (ref 0–1.8)
BASOPHILS # BLD: 0.03 K/UL (ref 0–0.12)
BILIRUB SERPL-MCNC: 0.5 MG/DL (ref 0.1–1.5)
BUN SERPL-MCNC: 16 MG/DL (ref 8–22)
CALCIUM SERPL-MCNC: 9.3 MG/DL (ref 8.5–10.5)
CHLORIDE SERPL-SCNC: 99 MMOL/L (ref 96–112)
CO2 SERPL-SCNC: 23 MMOL/L (ref 20–33)
CREAT SERPL-MCNC: 0.73 MG/DL (ref 0.5–1.4)
EOSINOPHIL # BLD AUTO: 0.02 K/UL (ref 0–0.51)
EOSINOPHIL NFR BLD: 0.4 % (ref 0–6.9)
ERYTHROCYTE [DISTWIDTH] IN BLOOD BY AUTOMATED COUNT: 56.2 FL (ref 35.9–50)
GFR SERPL CREATININE-BSD FRML MDRD: >60 ML/MIN/1.73 M 2
GLOBULIN SER CALC-MCNC: 2.8 G/DL (ref 1.9–3.5)
GLUCOSE SERPL-MCNC: 306 MG/DL (ref 65–99)
HCT VFR BLD AUTO: 38.1 % (ref 42–52)
HGB BLD-MCNC: 11.8 G/DL (ref 14–18)
IMM GRANULOCYTES # BLD AUTO: 0.03 K/UL (ref 0–0.11)
IMM GRANULOCYTES NFR BLD AUTO: 0.6 % (ref 0–0.9)
LYMPHOCYTES # BLD AUTO: 1.92 K/UL (ref 1–4.8)
LYMPHOCYTES NFR BLD: 39.1 % (ref 22–41)
MCH RBC QN AUTO: 29.1 PG (ref 27–33)
MCHC RBC AUTO-ENTMCNC: 31 G/DL (ref 33.7–35.3)
MCV RBC AUTO: 94.1 FL (ref 81.4–97.8)
MONOCYTES # BLD AUTO: 0.43 K/UL (ref 0–0.85)
MONOCYTES NFR BLD AUTO: 8.8 % (ref 0–13.4)
NEUTROPHILS # BLD AUTO: 2.48 K/UL (ref 1.82–7.42)
NEUTROPHILS NFR BLD: 50.5 % (ref 44–72)
NRBC # BLD AUTO: 0 K/UL
NRBC BLD AUTO-RTO: 0 /100 WBC
PLATELET # BLD AUTO: 306 K/UL (ref 164–446)
PMV BLD AUTO: 9.7 FL (ref 9–12.9)
POTASSIUM SERPL-SCNC: 4.1 MMOL/L (ref 3.6–5.5)
PROT SERPL-MCNC: 6.4 G/DL (ref 6–8.2)
RBC # BLD AUTO: 4.05 M/UL (ref 4.7–6.1)
SODIUM SERPL-SCNC: 132 MMOL/L (ref 135–145)
WBC # BLD AUTO: 4.9 K/UL (ref 4.8–10.8)

## 2017-10-10 PROCEDURE — 96415 CHEMO IV INFUSION ADDL HR: CPT

## 2017-10-10 PROCEDURE — 700102 HCHG RX REV CODE 250 W/ 637 OVERRIDE(OP): Performed by: INTERNAL MEDICINE

## 2017-10-10 PROCEDURE — 96374 THER/PROPH/DIAG INJ IV PUSH: CPT

## 2017-10-10 PROCEDURE — 96375 TX/PRO/DX INJ NEW DRUG ADDON: CPT

## 2017-10-10 PROCEDURE — 700105 HCHG RX REV CODE 258: Performed by: INTERNAL MEDICINE

## 2017-10-10 PROCEDURE — A9270 NON-COVERED ITEM OR SERVICE: HCPCS | Performed by: INTERNAL MEDICINE

## 2017-10-10 PROCEDURE — 85025 COMPLETE CBC W/AUTO DIFF WBC: CPT

## 2017-10-10 PROCEDURE — 80053 COMPREHEN METABOLIC PANEL: CPT

## 2017-10-10 PROCEDURE — 700111 HCHG RX REV CODE 636 W/ 250 OVERRIDE (IP): Performed by: INTERNAL MEDICINE

## 2017-10-10 PROCEDURE — 96413 CHEMO IV INFUSION 1 HR: CPT

## 2017-10-10 RX ORDER — ONDANSETRON 8 MG/1
8 TABLET, ORALLY DISINTEGRATING ORAL
Status: CANCELLED | OUTPATIENT
Start: 2017-10-24

## 2017-10-10 RX ORDER — ACETAMINOPHEN 500 MG
1000 TABLET ORAL ONCE
Status: CANCELLED | OUTPATIENT
Start: 2017-10-24 | End: 2017-10-24

## 2017-10-10 RX ORDER — SODIUM CHLORIDE 9 MG/ML
1000 INJECTION, SOLUTION INTRAVENOUS CONTINUOUS
Status: CANCELLED | OUTPATIENT
Start: 2017-10-24

## 2017-10-10 RX ORDER — LIDOCAINE HYDROCHLORIDE 10 MG/ML
0.5 INJECTION, SOLUTION INFILTRATION; PERINEURAL SEE ADMIN INSTRUCTIONS
Status: CANCELLED | OUTPATIENT
Start: 2017-10-24

## 2017-10-10 RX ORDER — ACETAMINOPHEN 500 MG
1000 TABLET ORAL ONCE
Status: COMPLETED | OUTPATIENT
Start: 2017-10-10 | End: 2017-10-10

## 2017-10-10 RX ORDER — SODIUM CHLORIDE 9 MG/ML
1000 INJECTION, SOLUTION INTRAVENOUS ONCE
Status: COMPLETED | OUTPATIENT
Start: 2017-10-10 | End: 2017-10-10

## 2017-10-10 RX ORDER — MEPERIDINE HYDROCHLORIDE 25 MG/ML
12.5 INJECTION INTRAMUSCULAR; INTRAVENOUS; SUBCUTANEOUS PRN
Status: CANCELLED | OUTPATIENT
Start: 2017-10-24

## 2017-10-10 RX ORDER — ONDANSETRON 2 MG/ML
4 INJECTION INTRAMUSCULAR; INTRAVENOUS
Status: CANCELLED | OUTPATIENT
Start: 2017-10-24

## 2017-10-10 RX ORDER — PROCHLORPERAZINE MALEATE 10 MG
10 TABLET ORAL EVERY 6 HOURS PRN
Status: CANCELLED | OUTPATIENT
Start: 2017-10-24

## 2017-10-10 RX ADMIN — ACETAMINOPHEN 1000 MG: 500 TABLET ORAL at 11:54

## 2017-10-10 RX ADMIN — SODIUM CHLORIDE 1000 ML: 9 INJECTION, SOLUTION INTRAVENOUS at 11:30

## 2017-10-10 RX ADMIN — OBINUTUZUMAB 1000 MG: 1000 INJECTION, SOLUTION, CONCENTRATE INTRAVENOUS at 12:49

## 2017-10-10 RX ADMIN — DIPHENHYDRAMINE HYDROCHLORIDE 25 MG: 50 INJECTION INTRAMUSCULAR; INTRAVENOUS at 11:54

## 2017-10-10 RX ADMIN — DEXAMETHASONE SODIUM PHOSPHATE 20 MG: 4 INJECTION, SOLUTION INTRAMUSCULAR; INTRAVENOUS at 11:54

## 2017-10-10 NOTE — PROGRESS NOTES
Chemotherapy Verification - SECONDARY RN       Height = 64.96 in  Weight = 57.3 kg  BSA = 1.62 m2       Medication: Gazyva  Dose: SET DOSE  Calculated Dose: 1000 mg                             (In mg/m2, AUC, mg/kg)     I confirm that this process was performed independently.

## 2017-10-10 NOTE — PROGRESS NOTES
"Pharmacy Chemotherapy Note    Patient Name: Aamnuel Zamora  Dx: CLL     Protocol: Obinutuzumab + chlorambucil  Chlorambucil:  0.5 mg/kg PO days 1 and 15  Obinutuzumab:   100 mg IV on day 1 cycle 1 followed by  900 mg IV on day 2 of cycle 1, followed by  1000 mg IV on days 8 and 15 of cycle 1, followed by  1000 mg IV on day 1 cycles 2-6  NCCN guidelines for chronic lymphocytic leukemia/small lymphocytic lymphoma V.1.2017  Beatriz V, et al. N Engl J Med. 2014;370(12):1101-10        /64   Pulse 99   Temp 37 °C (98.6 °F)   Resp 18   Ht 1.65 m (5' 4.96\")   Wt 57.3 kg (126 lb 5.2 oz)   SpO2 98%   BMI 21.05 kg/m²  Body surface area is 1.62 meters squared.    ANC~ 2480 Plt = 306k   Hgb = 11.8     SCr = 0.73mg/dL CrCl ~ 84mL/min   LFT's = WNL TBili = 0.5         Drug Order   (Drug name, dose, route, IV Fluid & volume, frequency, number of doses) Cycle: 1 Day 15  (D8 cx for low plt)  Previous treatment: 9/23/2017     Medication = Obinutuzumab  Base Dose= 1000mg fixed dose  Calc Dose: Base Dose: no calc. needed  Final Dose = 1000mg  Route = IV  Fluid & Volume =  mL  Admin Duration = Titrate as rosalie to a max rate of 400mg/hr          <5% difference, OK to treat with final dose      By my signature below, I confirm this process was performed independently with the BSA and all final chemotherapy dosing calculations congruent. I have reviewed the above chemotherapy order and that my calculation of the final dose and BSA (when applicable) corroborate those calculations of the  pharmacist. Discrepancies of 5% or greater in the written dose have been addressed and documented within the [facility] Progress notes.    Melissa Oleary PharmD  "

## 2017-10-10 NOTE — PROGRESS NOTES
Chemotherapy Verification - PRIMARY RN      Height = 165 cm  Weight = 57.3 kg  BSA = 1.62 m2       Medication: Gazyva  Dose: 1,000 mg set dise  Calculated Dose: 1,000 mg set dose                             (In mg/m2, AUC, mg/kg)         I confirm this process was performed independently with the BSA and all final chemotherapy dosing calculations congruent.  Any discrepancies of 5% or greater have been addressed with the chemotherapy pharmacist. The resolution of the discrepancy has been documented in the EPIC progress notes.

## 2017-10-10 NOTE — PROGRESS NOTES
"Pharmacy Chemotherapy Calculation:    Patient name: Amanuel Zamora  DX: CLL    Protocol: Chlorambucil + Obinutuzumab     Cycle 1, Day 15 delayed     Previous treatment: C1D2 = 09/23/17; D8 was held for thrombocytopenia     *Dosing Reference*  Chlorambucil 0.5 mg/kg PO on Days 1 and 15 - patient taking own   Obinutuzumab 100 mg IV on Day 1, followed by 900 mg IV on Day 2, followed by  1000 mg IV on Days 8 and 15 for cycle 1   Obinutuzumab 1000 mg IV on Day 1 for cycles 2-6  Every 28 days for 6 cycles   NCCN Guidelines for CLL.V.1.2017  Beatriz V, et al. N Engl J Med. 2014 Mar 20;370(12):1101-10.     Allergies:  Review of patient's allergies indicates not on file.    /64   Pulse 99   Temp 37 °C (98.6 °F)   Resp 18   Ht 1.65 m (5' 4.96\")   Wt 57.3 kg (126 lb 5.2 oz)   SpO2 98%   BMI 21.05 kg/m²   Body surface area is 1.62 meters squared.    10/10/17  ANC ~2500  PLT = 306k    Hgb = 11.8  Scr =  0.73mg/dL   Est CrCl ~84 ml/min    LFTs = 12/10/55  Tbili = 0.5     9/13/2017 09:57   Hepatitis B Surface Antigen Negative   Hepatitis B Cors Ab,IgM Negative       Obinutuzumab (Garzyva) 1000 mg fixed dose   <5% difference, okay to treat with final dose = 1000 mg IV on Day 15      Payal Asencio, PharmD        "

## 2017-10-11 NOTE — PROGRESS NOTES
Patient presents for day 15 Gazyva infusion. Reviewed plan of care, patient verbalizes understanding. IV started, flushes well with brisk blood return, labs drawn as ordered. Reviewed results, within parameters to proceed with treatment today. Gazyva titrated up per guidelines and patient tolerance. Infusion completed with no new patient complaints, line flushed clear. IV discontinued, catheter intact, compression dressing to site. Reviewed with pharmacist and patient when he returns for his next appointment, patient verbalizes understanding and released in no acute distress.

## 2017-10-12 NOTE — TELEPHONE ENCOUNTER
Was the patient seen in the last year in this department? Yes     Does patient have an active prescription for medications requested? No     Received Request Via: Pharmacy     Thank you

## 2017-10-16 NOTE — PROGRESS NOTES
Patient is currently enrolled in Gazyva replacemetn medication patient assistance program.  Patient credited for the following dates:  9/22/17 9/23/17        Perri Narvaez   Pharmacy Patient Advocate

## 2017-10-20 ENCOUNTER — APPOINTMENT (OUTPATIENT)
Dept: ONCOLOGY | Facility: MEDICAL CENTER | Age: 62
End: 2017-10-20
Attending: INTERNAL MEDICINE
Payer: COMMERCIAL

## 2017-10-20 ENCOUNTER — TELEPHONE (OUTPATIENT)
Dept: HEMATOLOGY ONCOLOGY | Facility: MEDICAL CENTER | Age: 62
End: 2017-10-20

## 2017-10-20 DIAGNOSIS — Z51.11 ENCOUNTER FOR ANTINEOPLASTIC CHEMOTHERAPY: ICD-10-CM

## 2017-10-20 DIAGNOSIS — C91.10 CLL (CHRONIC LYMPHOCYTIC LEUKEMIA) (HCC): ICD-10-CM

## 2017-10-20 NOTE — TELEPHONE ENCOUNTER
"RD attempted to call patient via telephone to set up nutrition follow-up.  Patient is currently unavailable and mailbox is reportedly \"full\" - RD unable to leave  with contact information.  Please have patient call dietitian at [phone].      "

## 2017-10-23 ENCOUNTER — OFFICE VISIT (OUTPATIENT)
Dept: HEMATOLOGY ONCOLOGY | Facility: MEDICAL CENTER | Age: 62
End: 2017-10-23

## 2017-10-23 ENCOUNTER — HOSPITAL ENCOUNTER (OUTPATIENT)
Facility: MEDICAL CENTER | Age: 62
End: 2017-10-23
Attending: NURSE PRACTITIONER
Payer: COMMERCIAL

## 2017-10-23 ENCOUNTER — NON-PROVIDER VISIT (OUTPATIENT)
Dept: HEMATOLOGY ONCOLOGY | Facility: MEDICAL CENTER | Age: 62
End: 2017-10-23

## 2017-10-23 VITALS
HEART RATE: 102 BPM | RESPIRATION RATE: 16 BRPM | WEIGHT: 130.84 LBS | OXYGEN SATURATION: 97 % | SYSTOLIC BLOOD PRESSURE: 128 MMHG | DIASTOLIC BLOOD PRESSURE: 86 MMHG | BODY MASS INDEX: 21.8 KG/M2 | TEMPERATURE: 98.4 F | HEIGHT: 65 IN

## 2017-10-23 DIAGNOSIS — Z51.11 ENCOUNTER FOR ANTINEOPLASTIC CHEMOTHERAPY: ICD-10-CM

## 2017-10-23 DIAGNOSIS — C91.10 CLL (CHRONIC LYMPHOCYTIC LEUKEMIA) (HCC): ICD-10-CM

## 2017-10-23 DIAGNOSIS — E11.9 DIABETES MELLITUS TYPE 2 IN NONOBESE (HCC): ICD-10-CM

## 2017-10-23 DIAGNOSIS — Z51.12 ENCOUNTER FOR ANTINEOPLASTIC IMMUNOTHERAPY: ICD-10-CM

## 2017-10-23 LAB
ALBUMIN SERPL BCP-MCNC: 3.9 G/DL (ref 3.2–4.9)
ALBUMIN/GLOB SERPL: 1.4 G/DL
ALP SERPL-CCNC: 50 U/L (ref 30–99)
ALT SERPL-CCNC: 7 U/L (ref 2–50)
ANION GAP SERPL CALC-SCNC: 5 MMOL/L (ref 0–11.9)
AST SERPL-CCNC: 9 U/L (ref 12–45)
BASOPHILS # BLD AUTO: 0.4 % (ref 0–1.8)
BASOPHILS # BLD: 0.04 K/UL (ref 0–0.12)
BILIRUB SERPL-MCNC: 0.6 MG/DL (ref 0.1–1.5)
BUN SERPL-MCNC: 14 MG/DL (ref 8–22)
CALCIUM SERPL-MCNC: 9.7 MG/DL (ref 8.5–10.5)
CHLORIDE SERPL-SCNC: 101 MMOL/L (ref 96–112)
CO2 SERPL-SCNC: 29 MMOL/L (ref 20–33)
CREAT SERPL-MCNC: 0.81 MG/DL (ref 0.5–1.4)
EOSINOPHIL # BLD AUTO: 0.05 K/UL (ref 0–0.51)
EOSINOPHIL NFR BLD: 0.5 % (ref 0–6.9)
ERYTHROCYTE [DISTWIDTH] IN BLOOD BY AUTOMATED COUNT: 51.4 FL (ref 35.9–50)
GFR SERPL CREATININE-BSD FRML MDRD: >60 ML/MIN/1.73 M 2
GLOBULIN SER CALC-MCNC: 2.7 G/DL (ref 1.9–3.5)
GLUCOSE SERPL-MCNC: 249 MG/DL (ref 65–99)
HCT VFR BLD AUTO: 41.4 % (ref 42–52)
HGB BLD-MCNC: 13.4 G/DL (ref 14–18)
IMM GRANULOCYTES # BLD AUTO: 0.04 K/UL (ref 0–0.11)
IMM GRANULOCYTES NFR BLD AUTO: 0.4 % (ref 0–0.9)
LYMPHOCYTES # BLD AUTO: 2.61 K/UL (ref 1–4.8)
LYMPHOCYTES NFR BLD: 25.5 % (ref 22–41)
MCH RBC QN AUTO: 28.9 PG (ref 27–33)
MCHC RBC AUTO-ENTMCNC: 32.4 G/DL (ref 33.7–35.3)
MCV RBC AUTO: 89.4 FL (ref 81.4–97.8)
MONOCYTES # BLD AUTO: 0.68 K/UL (ref 0–0.85)
MONOCYTES NFR BLD AUTO: 6.7 % (ref 0–13.4)
NEUTROPHILS # BLD AUTO: 6.8 K/UL (ref 1.82–7.42)
NEUTROPHILS NFR BLD: 66.5 % (ref 44–72)
NRBC # BLD AUTO: 0 K/UL
NRBC BLD AUTO-RTO: 0 /100 WBC
PLATELET # BLD AUTO: 211 K/UL (ref 164–446)
PMV BLD AUTO: 10.1 FL (ref 9–12.9)
POTASSIUM SERPL-SCNC: 4.2 MMOL/L (ref 3.6–5.5)
PROT SERPL-MCNC: 6.6 G/DL (ref 6–8.2)
RBC # BLD AUTO: 4.63 M/UL (ref 4.7–6.1)
SODIUM SERPL-SCNC: 135 MMOL/L (ref 135–145)
URATE SERPL-MCNC: 3.8 MG/DL (ref 2.5–8.3)
WBC # BLD AUTO: 10.2 K/UL (ref 4.8–10.8)

## 2017-10-23 PROCEDURE — 36415 COLL VENOUS BLD VENIPUNCTURE: CPT | Performed by: NURSE PRACTITIONER

## 2017-10-23 PROCEDURE — 99214 OFFICE O/P EST MOD 30 MIN: CPT | Performed by: NURSE PRACTITIONER

## 2017-10-23 PROCEDURE — 85025 COMPLETE CBC W/AUTO DIFF WBC: CPT

## 2017-10-23 PROCEDURE — 84550 ASSAY OF BLOOD/URIC ACID: CPT

## 2017-10-23 PROCEDURE — 80053 COMPREHEN METABOLIC PANEL: CPT

## 2017-10-23 RX ORDER — ALLOPURINOL 100 MG/1
100 TABLET ORAL DAILY
COMMUNITY
End: 2017-11-21

## 2017-10-23 ASSESSMENT — ENCOUNTER SYMPTOMS
BLURRED VISION: 0
WEIGHT LOSS: 0
WHEEZING: 0
FALLS: 0
DIARRHEA: 0
NAUSEA: 0
MYALGIAS: 0
COUGH: 0
SHORTNESS OF BREATH: 0
TINGLING: 0
VOMITING: 0
CHILLS: 0
HEADACHES: 0
PALPITATIONS: 0
DIZZINESS: 0
FEVER: 0
CONSTIPATION: 0

## 2017-10-23 ASSESSMENT — PAIN SCALES - GENERAL: PAINLEVEL: NO PAIN

## 2017-10-23 NOTE — PROGRESS NOTES
"Pharmacy Chemotherapy Calculation:    Patient name: Amanuel Zamora  DX: CLL    Protocol: Chlorambucil + Obinutuzumab     Cycle 2, Day 1  Previous treatment: 10/10/17    *Dosing Reference*  Chlorambucil 0.5 mg/kg PO on Days 1 and 15 - patient taking own   Obinutuzumab 100 mg IV on Day 1, followed by 900 mg IV on Day 2, followed by  1000 mg IV on Days 8 and 15 for cycle 1   Obinutuzumab 1000 mg IV on Day 1 for cycles 2-6  Every 28 days for 6 cycles   NCCN Guidelines for CLL.V.1.2017  Beatriz V et al. N Engl J Med. 2014 Mar 20;370(12):1101-10.     Allergies:  Review of patient's allergies indicates no known allergies.    /84   Pulse 93   Temp 36.6 °C (97.8 °F)   Resp 18   Ht 1.65 m (5' 4.96\")   Wt 58 kg (127 lb 13.9 oz)   SpO2 98%   BMI 21.30 kg/m²   Body surface area is 1.63 meters squared.    10/23/17:  ANC~ 6800 Plt = 211k   Hgb = 13.4     SCr = 0.81mg/dL CrCl ~ 77mL/min   LFT's = 9/7/50     TBili = 0.6   Uric acic = 3.8        9/13/2017 09:57   Hepatitis B Surface Antigen Negative   Hepatitis B Cors Ab,IgM Negative       Obinutuzumab (Garzyva) 1000 mg fixed dose   <5% difference, okay to treat with final dose = 1000 mg IV on Day 1      MANAS Oleary Pharm.D.          "

## 2017-10-23 NOTE — PROGRESS NOTES
"Subjective:      Said Noah is a 62 y.o. male who presents for Follow-Up (prechemo), for cycle 2 obinutuzumab and chlorambucil evaluation.      HPI   Mr. Zamora presents for cycle 2 obinutuzumab and chlorambucil treatment evaluation for IGH mutated, 17 P deletion chronic lymphocytic leukemia (CLL). He is accompanied by his wife and his son-in-law.    He received cycle 1 obinutuzumab on day 1 and day 2. Day 8 obinutuzumab was held due to thrombocytopenia. Day 15 was delayed by 3 days due to thrombocytopenia. He took chlorambucil on days 1 and 15 (delayed by 3 days).     He denies fever, chills, malaise, fatigue, GI/ changes. Reports of chest pain were self-limiting and have resolved.      Review of Systems   Constitutional: Negative for chills, fever, malaise/fatigue (not walking very often) and weight loss.   Eyes: Negative for blurred vision.   Respiratory: Negative for cough, shortness of breath and wheezing.    Cardiovascular: Negative for chest pain, palpitations and leg swelling.   Gastrointestinal: Negative for constipation (Last BM last night - solid), diarrhea, nausea and vomiting.   Genitourinary: Negative for dysuria.   Musculoskeletal: Negative for falls, joint pain and myalgias.   Skin: Negative for itching and rash.   Neurological: Negative for dizziness, tingling and headaches.        Objective:     /86   Pulse (!) 102   Temp 36.9 °C (98.4 °F)   Resp 16   Ht 1.65 m (5' 4.96\")   Wt 59.4 kg (130 lb 13.5 oz)   SpO2 97%   BMI 21.80 kg/m²      Physical Exam   Constitutional: He is oriented to person, place, and time. He appears well-developed and well-nourished. No distress.   HENT:   Head: Normocephalic and atraumatic.   Mouth/Throat: Oropharynx is clear and moist. No oropharyngeal exudate.   Halotosis, poor dentition   Eyes: Conjunctivae and EOM are normal. Pupils are equal, round, and reactive to light. Right eye exhibits no discharge. Left eye exhibits no discharge. No scleral " icterus.   Neck: Normal range of motion. Neck supple. No thyromegaly present.   Cardiovascular: Normal rate, regular rhythm, normal heart sounds and intact distal pulses.  Exam reveals no gallop and no friction rub.    No murmur heard.  Pulmonary/Chest: Effort normal and breath sounds normal. No respiratory distress. He has no wheezes.   Abdominal: Soft. Bowel sounds are normal. He exhibits no distension. There is no tenderness.   Musculoskeletal: Normal range of motion. He exhibits no edema or tenderness.   Lymphadenopathy:        Head (right side): No submental, no submandibular, no tonsillar, no preauricular, no posterior auricular and no occipital adenopathy present.        Head (left side): No submental, no submandibular, no tonsillar, no preauricular, no posterior auricular and no occipital adenopathy present.     He has no cervical adenopathy.        Right cervical: No superficial cervical and no deep cervical adenopathy present.       Left cervical: No superficial cervical and no deep cervical adenopathy present.        Right: No supraclavicular adenopathy present.        Left: No supraclavicular adenopathy present.   Neurological: He is alert and oriented to person, place, and time.   Skin: Skin is warm and dry. No rash noted. He is not diaphoretic. No erythema. No pallor.   Psychiatric: He has a normal mood and affect. His behavior is normal.   Slightly anxious   Vitals reviewed.    Hospital Outpatient Visit on 10/23/2017   Component Date Value Ref Range Status   • WBC 10/23/2017 10.2  4.8 - 10.8 K/uL Final   • RBC 10/23/2017 4.63* 4.70 - 6.10 M/uL Final   • Hemoglobin 10/23/2017 13.4* 14.0 - 18.0 g/dL Final   • Hematocrit 10/23/2017 41.4* 42.0 - 52.0 % Final   • MCV 10/23/2017 89.4  81.4 - 97.8 fL Final   • MCH 10/23/2017 28.9  27.0 - 33.0 pg Final   • MCHC 10/23/2017 32.4* 33.7 - 35.3 g/dL Final   • RDW 10/23/2017 51.4* 35.9 - 50.0 fL Final   • Platelet Count 10/23/2017 211  164 - 446 K/uL Final   • MPV  10/23/2017 10.1  9.0 - 12.9 fL Final   • Neutrophils-Polys 10/23/2017 66.50  44.00 - 72.00 % Final   • Lymphocytes 10/23/2017 25.50  22.00 - 41.00 % Final   • Monocytes 10/23/2017 6.70  0.00 - 13.40 % Final   • Eosinophils 10/23/2017 0.50  0.00 - 6.90 % Final   • Basophils 10/23/2017 0.40  0.00 - 1.80 % Final   • Immature Granulocytes 10/23/2017 0.40  0.00 - 0.90 % Final   • Nucleated RBC 10/23/2017 0.00  /100 WBC Final   • Neutrophils (Absolute) 10/23/2017 6.80  1.82 - 7.42 K/uL Final   • Lymphs (Absolute) 10/23/2017 2.61  1.00 - 4.80 K/uL Final   • Monos (Absolute) 10/23/2017 0.68  0.00 - 0.85 K/uL Final   • Eos (Absolute) 10/23/2017 0.05  0.00 - 0.51 K/uL Final   • Baso (Absolute) 10/23/2017 0.04  0.00 - 0.12 K/uL Final   • Immature Granulocytes (abs) 10/23/2017 0.04  0.00 - 0.11 K/uL Final   • NRBC (Absolute) 10/23/2017 0.00  K/uL Final   • Sodium 10/23/2017 135  135 - 145 mmol/L Final   • Potassium 10/23/2017 4.2  3.6 - 5.5 mmol/L Final   • Chloride 10/23/2017 101  96 - 112 mmol/L Final   • Co2 10/23/2017 29  20 - 33 mmol/L Final   • Anion Gap 10/23/2017 5.0  0.0 - 11.9 Final   • Glucose 10/23/2017 249* 65 - 99 mg/dL Final   • Bun 10/23/2017 14  8 - 22 mg/dL Final   • Creatinine 10/23/2017 0.81  0.50 - 1.40 mg/dL Final   • Calcium 10/23/2017 9.7  8.5 - 10.5 mg/dL Final   • AST(SGOT) 10/23/2017 9* 12 - 45 U/L Final   • ALT(SGPT) 10/23/2017 7  2 - 50 U/L Final   • Alkaline Phosphatase 10/23/2017 50  30 - 99 U/L Final   • Total Bilirubin 10/23/2017 0.6  0.1 - 1.5 mg/dL Final   • Albumin 10/23/2017 3.9  3.2 - 4.9 g/dL Final   • Total Protein 10/23/2017 6.6  6.0 - 8.2 g/dL Final   • Globulin 10/23/2017 2.7  1.9 - 3.5 g/dL Final   • A-G Ratio 10/23/2017 1.4  g/dL Final   • Uric Acid 10/23/2017 3.8  2.5 - 8.3 mg/dL Final   • GFR If  10/23/2017 >60  >60 mL/min/1.73 m 2 Final   • GFR If Non  10/23/2017 >60  >60 mL/min/1.73 m 2 Final            Assessment/Plan:     1. CLL (chronic  lymphocytic leukemia) (CMS-HCC)     2. Diabetes mellitus type 2 in nonobese (CMS-HCC)     3. Encounter for antineoplastic immunotherapy         1.  Hyperglycemia: He is status post high-dose dexamethasone. Patient continues with metformin twice a day and healthy diet for diabetes. He is encouraged to increase water intake and activity, as tolerated. We will continue to monitor for improvement.    2.  CLL: He is tolerating treatment fairly well. He continues on daily acyclovir for viral prophylaxis. He is no longer taking allopurinol as his uric acid in the normal range. CBC shows counts have improved, WBC is a little higher than last week, he remains without fever or chills.    We will proceed with cycle 2 obinutuzumab, and cycle 2 day 1 chlorambucil. He will continue with weekly labs and be certain to call for results prior to day 15 dosing of chlorambucil. He'll return in 4 weeks for cycle 3 evaluation, sooner as needed.    He desires to obtain weekly labs outpatient as this is less expensive for him, he continues to be self-pay patient.        Please note that this dictation was created using voice recognition software. I have made every reasonable attempt to correct obvious errors, but I expect that there are errors of grammar and possibly content that I did not discover before finalizing the note.

## 2017-10-23 NOTE — PROGRESS NOTES
Amanuel Zamora is a 62 y.o. male here for a non-provider visit for: Lab Draws  on 10/23/2017 at 1:28 PM    Procedure Performed: Venipuncture     Anatomical site: Left Antecubital Area (AC)    Equipment used: 21g    Labs drawn: CBC w/diff, CMP and Uric acid    Ordering Provider: Winnie Rodrigues By: Nona Anna, Xavier Ass't  No complications

## 2017-10-24 ENCOUNTER — DOCUMENTATION (OUTPATIENT)
Dept: HEMATOLOGY ONCOLOGY | Facility: MEDICAL CENTER | Age: 62
End: 2017-10-24

## 2017-10-24 ENCOUNTER — TELEPHONE (OUTPATIENT)
Dept: HEMATOLOGY ONCOLOGY | Facility: MEDICAL CENTER | Age: 62
End: 2017-10-24

## 2017-10-24 ENCOUNTER — OUTPATIENT INFUSION SERVICES (OUTPATIENT)
Dept: ONCOLOGY | Facility: MEDICAL CENTER | Age: 62
End: 2017-10-24
Attending: INTERNAL MEDICINE
Payer: COMMERCIAL

## 2017-10-24 ENCOUNTER — AMB ONCOLOGY NURSE NAVIGATOR ENCOUNTER (OUTPATIENT)
Dept: OTHER | Facility: MEDICAL CENTER | Age: 62
End: 2017-10-24

## 2017-10-24 VITALS
HEART RATE: 93 BPM | TEMPERATURE: 97.8 F | BODY MASS INDEX: 21.3 KG/M2 | OXYGEN SATURATION: 98 % | SYSTOLIC BLOOD PRESSURE: 135 MMHG | RESPIRATION RATE: 18 BRPM | DIASTOLIC BLOOD PRESSURE: 84 MMHG | WEIGHT: 127.87 LBS | HEIGHT: 65 IN

## 2017-10-24 DIAGNOSIS — C91.10 CLL (CHRONIC LYMPHOCYTIC LEUKEMIA) (HCC): ICD-10-CM

## 2017-10-24 PROCEDURE — 700105 HCHG RX REV CODE 258

## 2017-10-24 PROCEDURE — 700105 HCHG RX REV CODE 258: Performed by: NURSE PRACTITIONER

## 2017-10-24 PROCEDURE — A9270 NON-COVERED ITEM OR SERVICE: HCPCS | Performed by: NURSE PRACTITIONER

## 2017-10-24 PROCEDURE — 96413 CHEMO IV INFUSION 1 HR: CPT

## 2017-10-24 PROCEDURE — 96415 CHEMO IV INFUSION ADDL HR: CPT

## 2017-10-24 PROCEDURE — 700102 HCHG RX REV CODE 250 W/ 637 OVERRIDE(OP): Performed by: NURSE PRACTITIONER

## 2017-10-24 PROCEDURE — 96375 TX/PRO/DX INJ NEW DRUG ADDON: CPT

## 2017-10-24 PROCEDURE — 700111 HCHG RX REV CODE 636 W/ 250 OVERRIDE (IP): Performed by: NURSE PRACTITIONER

## 2017-10-24 RX ORDER — SODIUM CHLORIDE 9 MG/ML
1000 INJECTION, SOLUTION INTRAVENOUS CONTINUOUS
Status: DISCONTINUED | OUTPATIENT
Start: 2017-10-24 | End: 2017-10-24 | Stop reason: HOSPADM

## 2017-10-24 RX ORDER — ACETAMINOPHEN 500 MG
1000 TABLET ORAL ONCE
Status: COMPLETED | OUTPATIENT
Start: 2017-10-24 | End: 2017-10-24

## 2017-10-24 RX ADMIN — DEXAMETHASONE SODIUM PHOSPHATE 20 MG: 4 INJECTION, SOLUTION INTRAMUSCULAR; INTRAVENOUS at 10:24

## 2017-10-24 RX ADMIN — ACETAMINOPHEN 1000 MG: 500 TABLET ORAL at 10:24

## 2017-10-24 RX ADMIN — SODIUM CHLORIDE 1000 ML: 9 INJECTION, SOLUTION INTRAVENOUS at 10:05

## 2017-10-24 RX ADMIN — OBINUTUZUMAB 1000 MG: 1000 INJECTION, SOLUTION, CONCENTRATE INTRAVENOUS at 11:05

## 2017-10-24 RX ADMIN — DIPHENHYDRAMINE HYDROCHLORIDE 25 MG: 50 INJECTION INTRAMUSCULAR; INTRAVENOUS at 10:40

## 2017-10-24 ASSESSMENT — PAIN SCALES - GENERAL: PAINLEVEL: NO PAIN

## 2017-10-24 NOTE — PROGRESS NOTES
Nutrition Progress Note:  Patient's weight per stand up scale in [facility] today = 127 lbs (% wt change x1 month = 3.7, which is classified as insignificant loss, per guidelines).      Per interview with dietitian, patient states good appetite and denies any changes in taste, N/V, diarrhea or constipation at this time.  He continues with usual eating regimen of fruits, vegetables, grains and meats/seafood.  RD encouraged pt's continued efforts.  He does not have any further questions at this time.

## 2017-10-24 NOTE — PROGRESS NOTES
Pt arrived to [facility], ambulatory, for C2D1 Gazyva infusion. Pt voices no complaints. 24g PIV established in the R-upper arm, positive blood return noted. Labs reviewed from 10/23, pt within parameters to treat today. Pre-medications given with no s/sx of adverse reaction. Gazyva infused with no s/sx of adverse reaction, titrated per protocol. PIV flushed and removed. Pt left IS with no s/sx of distress. Follow up appointment confirmed.

## 2017-10-24 NOTE — PROGRESS NOTES
Chemotherapy Verification - SECONDARY RN       Height = 165 cm  Weight = 58 kg  BSA = 1.630 mg       Medication: Gazyva  Dose: set dose  Calculated Dose: 1000 mg                             (In mg/m2, AUC, mg/kg)       I confirm that this process was performed independently.

## 2017-10-24 NOTE — PROGRESS NOTES
"Pharmacy Chemotherapy Note    Patient Name: Amanuel Zamora  Dx: CLL     Protocol: Obinutuzumab + chlorambucil    *Dosing Reference*  Chlorambucil:  0.5 mg/kg PO days 1 and 15  Obinutuzumab:   100 mg IV on day 1 cycle 1 followed by  900 mg IV on day 2 of cycle 1, followed by  1000 mg IV on days 8 and 15 of cycle 1, followed by  1000 mg IV on day 1 cycles 2-6  Q28 days for 6 cycles   NCCN Guidelines for CLL.V.1.2017  Beatriz MUNOZ et al. N Engl J Med. 2014 Mar 20;370(12):1101-10.      Allergies: Review of patient's allergies indicates no known allergies.    /84   Pulse 93   Temp 36.6 °C (97.8 °F)   Resp 18   Ht 1.65 m (5' 4.96\")   Wt 58 kg (127 lb 13.9 oz)   SpO2 98%   BMI 21.30 kg/m²  Body surface area is 1.63 meters squared.    10/23/17  ANC~ 6800  Plt = 211k    Hgb = 13.4    SCr = 0.81mg/dL CrCl ~ 77mL/min   LFT's = WNL  TBili = 0.6   K = 4.2   Uric acid = 3.8        9/13/2017 09:57   Hepatitis B Surface Antigen Negative   Hepatitis B Cors Ab,IgM Negative         Drug Order   (Drug name, dose, route, IV Fluid & volume, frequency, number of doses) Cycle 2, Day 1   Previous treatment: C1D15 = 10/10/17     Medication = Obinutuzumab (Garzyva)  Base Dose= 1000mg fixed dose  Calc Dose: Base Dose: no calc. needed  Final Dose = 1000mg  Route = IV  Fluid & Volume =  mL  Admin Duration = Titrate as rosalie to a max rate of 400mg/hr          <5% difference, OK to treat with final dose      By my signature below, I confirm this process was performed independently with the BSA and all final chemotherapy dosing calculations congruent. I have reviewed the above chemotherapy order and that my calculation of the final dose and BSA (when applicable) corroborate those calculations of the  pharmacist. Discrepancies of 5% or greater in the written dose have been addressed and documented within the [facility] Progress notes.      Payal Asencio, PharmD  "

## 2017-10-24 NOTE — PROGRESS NOTES
Chemotherapy Verification - PRIMARY RN      Height = 165 cm  Weight = 58 kg  BSA = 1.63 m2       Medication: Gazyva  Dose: 1,000 mg (set dose)  Calculated Dose: 1,000 mg                             (In mg/m2, AUC, mg/kg)       I confirm this process was performed independently with the BSA and all final chemotherapy dosing calculations congruent.  Any discrepancies of 5% or greater have been addressed with the chemotherapy pharmacist. The resolution of the discrepancy has been documented in the EPIC progress notes.

## 2017-10-25 NOTE — PROGRESS NOTES
Met with patient and his spouse in [place]  Here for cycle 2.  No concerns noted by patient.  No needs from [facility] at this time.  Reports that his visa status was extended thru January and he will be able to continue with his course of recommended treatment.  Provided patient with my contact information again.  Will follow as needed.

## 2017-10-26 ENCOUNTER — APPOINTMENT (OUTPATIENT)
Dept: HEMATOLOGY ONCOLOGY | Facility: MEDICAL CENTER | Age: 62
End: 2017-10-26

## 2017-10-30 RX ORDER — ACYCLOVIR 400 MG/1
400 TABLET ORAL 2 TIMES DAILY
OUTPATIENT
Start: 2017-10-30

## 2017-10-30 NOTE — TELEPHONE ENCOUNTER
Was the patient seen in the last year in this department? Yes     Does patient have an active prescription for medications requested? No     Received Request Via: Patient     Thank you

## 2017-10-31 ENCOUNTER — HOSPITAL ENCOUNTER (OUTPATIENT)
Dept: LAB | Facility: MEDICAL CENTER | Age: 62
End: 2017-10-31
Attending: INTERNAL MEDICINE
Payer: COMMERCIAL

## 2017-10-31 LAB
BASOPHILS # BLD AUTO: 0.4 % (ref 0–1.8)
BASOPHILS # BLD: 0.03 K/UL (ref 0–0.12)
EOSINOPHIL # BLD AUTO: 0.27 K/UL (ref 0–0.51)
EOSINOPHIL NFR BLD: 3.3 % (ref 0–6.9)
ERYTHROCYTE [DISTWIDTH] IN BLOOD BY AUTOMATED COUNT: 51.6 FL (ref 35.9–50)
HCT VFR BLD AUTO: 42.3 % (ref 42–52)
HGB BLD-MCNC: 13.7 G/DL (ref 14–18)
IMM GRANULOCYTES # BLD AUTO: 0.03 K/UL (ref 0–0.11)
IMM GRANULOCYTES NFR BLD AUTO: 0.4 % (ref 0–0.9)
LYMPHOCYTES # BLD AUTO: 2.34 K/UL (ref 1–4.8)
LYMPHOCYTES NFR BLD: 29 % (ref 22–41)
MCH RBC QN AUTO: 29 PG (ref 27–33)
MCHC RBC AUTO-ENTMCNC: 32.4 G/DL (ref 33.7–35.3)
MCV RBC AUTO: 89.4 FL (ref 81.4–97.8)
MONOCYTES # BLD AUTO: 0.46 K/UL (ref 0–0.85)
MONOCYTES NFR BLD AUTO: 5.7 % (ref 0–13.4)
NEUTROPHILS # BLD AUTO: 4.94 K/UL (ref 1.82–7.42)
NEUTROPHILS NFR BLD: 61.2 % (ref 44–72)
NRBC # BLD AUTO: 0 K/UL
NRBC BLD AUTO-RTO: 0 /100 WBC
PLATELET # BLD AUTO: 156 K/UL (ref 164–446)
PMV BLD AUTO: 10.8 FL (ref 9–12.9)
RBC # BLD AUTO: 4.73 M/UL (ref 4.7–6.1)
WBC # BLD AUTO: 8.1 K/UL (ref 4.8–10.8)

## 2017-10-31 PROCEDURE — 36415 COLL VENOUS BLD VENIPUNCTURE: CPT

## 2017-10-31 PROCEDURE — 85025 COMPLETE CBC W/AUTO DIFF WBC: CPT

## 2017-10-31 NOTE — TELEPHONE ENCOUNTER
Patient contacted office for refill of Acyclovir - as of 9/12/2017 he had 30 tablets with 5 refills. No need for rx refill.

## 2017-11-02 ENCOUNTER — TELEPHONE (OUTPATIENT)
Dept: HEMATOLOGY ONCOLOGY | Facility: MEDICAL CENTER | Age: 62
End: 2017-11-02

## 2017-11-02 NOTE — TELEPHONE ENCOUNTER
I was unable to reach the patients son-in-law Ana to let him know per LAYLA Galeano he needs to call the pharmacy for a refill on the acyclovir as he should have plenty of refills left.

## 2017-11-03 NOTE — PROGRESS NOTES
Patient is currently enrolled in Gazyva replacement medication patient assistance program.  Patient credited for the following dates:  10/10/17  10/24/17       Perri Narvaez   Pharmacy Patient Advocate

## 2017-11-06 ENCOUNTER — HOSPITAL ENCOUNTER (OUTPATIENT)
Dept: LAB | Facility: MEDICAL CENTER | Age: 62
End: 2017-11-06
Attending: INTERNAL MEDICINE

## 2017-11-06 LAB
BASOPHILS # BLD AUTO: 0.7 % (ref 0–1.8)
BASOPHILS # BLD: 0.05 K/UL (ref 0–0.12)
EOSINOPHIL # BLD AUTO: 0.25 K/UL (ref 0–0.51)
EOSINOPHIL NFR BLD: 3.3 % (ref 0–6.9)
ERYTHROCYTE [DISTWIDTH] IN BLOOD BY AUTOMATED COUNT: 50.4 FL (ref 35.9–50)
HCT VFR BLD AUTO: 43.2 % (ref 42–52)
HGB BLD-MCNC: 13.8 G/DL (ref 14–18)
IMM GRANULOCYTES # BLD AUTO: 0.03 K/UL (ref 0–0.11)
IMM GRANULOCYTES NFR BLD AUTO: 0.4 % (ref 0–0.9)
LYMPHOCYTES # BLD AUTO: 2.36 K/UL (ref 1–4.8)
LYMPHOCYTES NFR BLD: 30.8 % (ref 22–41)
MCH RBC QN AUTO: 28.8 PG (ref 27–33)
MCHC RBC AUTO-ENTMCNC: 31.9 G/DL (ref 33.7–35.3)
MCV RBC AUTO: 90 FL (ref 81.4–97.8)
MONOCYTES # BLD AUTO: 0.47 K/UL (ref 0–0.85)
MONOCYTES NFR BLD AUTO: 6.1 % (ref 0–13.4)
NEUTROPHILS # BLD AUTO: 4.5 K/UL (ref 1.82–7.42)
NEUTROPHILS NFR BLD: 58.7 % (ref 44–72)
NRBC # BLD AUTO: 0 K/UL
NRBC BLD AUTO-RTO: 0 /100 WBC
PLATELET # BLD AUTO: 179 K/UL (ref 164–446)
PMV BLD AUTO: 10.7 FL (ref 9–12.9)
RBC # BLD AUTO: 4.8 M/UL (ref 4.7–6.1)
WBC # BLD AUTO: 7.7 K/UL (ref 4.8–10.8)

## 2017-11-06 PROCEDURE — 36415 COLL VENOUS BLD VENIPUNCTURE: CPT

## 2017-11-06 PROCEDURE — 85025 COMPLETE CBC W/AUTO DIFF WBC: CPT

## 2017-11-07 ENCOUNTER — TELEPHONE (OUTPATIENT)
Dept: HEMATOLOGY ONCOLOGY | Facility: MEDICAL CENTER | Age: 62
End: 2017-11-07

## 2017-11-07 NOTE — TELEPHONE ENCOUNTER
Son in law phoned to verify labs - ANC is 4.50, platelets 179  Patient will proceed with day 15 dosing of chlorambucil as per Treatment Plan and follow up as scheduled

## 2017-11-16 RX ORDER — ONDANSETRON 8 MG/1
8 TABLET, ORALLY DISINTEGRATING ORAL
Status: CANCELLED | OUTPATIENT
Start: 2017-11-21

## 2017-11-16 RX ORDER — MEPERIDINE HYDROCHLORIDE 25 MG/ML
12.5 INJECTION INTRAMUSCULAR; INTRAVENOUS; SUBCUTANEOUS PRN
Status: CANCELLED | OUTPATIENT
Start: 2017-11-21

## 2017-11-16 RX ORDER — SODIUM CHLORIDE 9 MG/ML
1000 INJECTION, SOLUTION INTRAVENOUS CONTINUOUS
Status: CANCELLED | OUTPATIENT
Start: 2017-11-21

## 2017-11-16 RX ORDER — PROCHLORPERAZINE MALEATE 10 MG
10 TABLET ORAL EVERY 6 HOURS PRN
Status: CANCELLED | OUTPATIENT
Start: 2017-11-21

## 2017-11-16 RX ORDER — ACETAMINOPHEN 500 MG
1000 TABLET ORAL ONCE
Status: CANCELLED | OUTPATIENT
Start: 2017-11-21 | End: 2017-11-21

## 2017-11-16 RX ORDER — ONDANSETRON 2 MG/ML
4 INJECTION INTRAMUSCULAR; INTRAVENOUS
Status: CANCELLED | OUTPATIENT
Start: 2017-11-21

## 2017-11-16 RX ORDER — LIDOCAINE HYDROCHLORIDE 10 MG/ML
0.5 INJECTION, SOLUTION INFILTRATION; PERINEURAL SEE ADMIN INSTRUCTIONS
Status: CANCELLED | OUTPATIENT
Start: 2017-11-21

## 2017-11-17 ENCOUNTER — TELEPHONE (OUTPATIENT)
Dept: HEMATOLOGY ONCOLOGY | Facility: MEDICAL CENTER | Age: 62
End: 2017-11-17

## 2017-11-17 DIAGNOSIS — C91.10 CLL (CHRONIC LYMPHOCYTIC LEUKEMIA) (HCC): ICD-10-CM

## 2017-11-18 NOTE — TELEPHONE ENCOUNTER
----- Message from Said Noah sent at 11/16/2017  9:24 PM PST -----  Regarding: Prescription Question  Contact: [phone]  Dear Dr. Malone,    I was wondering if it is a time to request a refill for the LEUKERAN ?    Thank you,    Noah

## 2017-11-20 ENCOUNTER — OFFICE VISIT (OUTPATIENT)
Dept: HEMATOLOGY ONCOLOGY | Facility: MEDICAL CENTER | Age: 62
End: 2017-11-20

## 2017-11-20 ENCOUNTER — HOSPITAL ENCOUNTER (OUTPATIENT)
Facility: MEDICAL CENTER | Age: 62
End: 2017-11-20
Attending: INTERNAL MEDICINE
Payer: COMMERCIAL

## 2017-11-20 ENCOUNTER — NON-PROVIDER VISIT (OUTPATIENT)
Dept: HEMATOLOGY ONCOLOGY | Facility: MEDICAL CENTER | Age: 62
End: 2017-11-20

## 2017-11-20 VITALS
DIASTOLIC BLOOD PRESSURE: 76 MMHG | WEIGHT: 136.24 LBS | OXYGEN SATURATION: 98 % | BODY MASS INDEX: 22.7 KG/M2 | SYSTOLIC BLOOD PRESSURE: 142 MMHG | HEIGHT: 65 IN | TEMPERATURE: 98.3 F | RESPIRATION RATE: 16 BRPM | HEART RATE: 98 BPM

## 2017-11-20 VITALS
WEIGHT: 136 LBS | OXYGEN SATURATION: 98 % | SYSTOLIC BLOOD PRESSURE: 142 MMHG | DIASTOLIC BLOOD PRESSURE: 76 MMHG | BODY MASS INDEX: 22.66 KG/M2 | RESPIRATION RATE: 16 BRPM | HEART RATE: 98 BPM | HEIGHT: 65 IN | TEMPERATURE: 98.3 F

## 2017-11-20 DIAGNOSIS — C91.10 CLL (CHRONIC LYMPHOCYTIC LEUKEMIA) (HCC): ICD-10-CM

## 2017-11-20 LAB
ALBUMIN SERPL BCP-MCNC: 4.1 G/DL (ref 3.2–4.9)
ALBUMIN/GLOB SERPL: 1.6 G/DL
ALP SERPL-CCNC: 48 U/L (ref 30–99)
ALT SERPL-CCNC: 6 U/L (ref 2–50)
ANION GAP SERPL CALC-SCNC: 9 MMOL/L (ref 0–11.9)
AST SERPL-CCNC: 10 U/L (ref 12–45)
BASOPHILS # BLD AUTO: 0.4 % (ref 0–1.8)
BASOPHILS # BLD: 0.03 K/UL (ref 0–0.12)
BILIRUB SERPL-MCNC: 0.4 MG/DL (ref 0.1–1.5)
BUN SERPL-MCNC: 17 MG/DL (ref 8–22)
CALCIUM SERPL-MCNC: 9.9 MG/DL (ref 8.5–10.5)
CHLORIDE SERPL-SCNC: 102 MMOL/L (ref 96–112)
CO2 SERPL-SCNC: 27 MMOL/L (ref 20–33)
CREAT SERPL-MCNC: 0.82 MG/DL (ref 0.5–1.4)
EOSINOPHIL # BLD AUTO: 0.13 K/UL (ref 0–0.51)
EOSINOPHIL NFR BLD: 1.6 % (ref 0–6.9)
ERYTHROCYTE [DISTWIDTH] IN BLOOD BY AUTOMATED COUNT: 47.4 FL (ref 35.9–50)
GFR SERPL CREATININE-BSD FRML MDRD: >60 ML/MIN/1.73 M 2
GLOBULIN SER CALC-MCNC: 2.6 G/DL (ref 1.9–3.5)
GLUCOSE SERPL-MCNC: 224 MG/DL (ref 65–99)
HCT VFR BLD AUTO: 45.8 % (ref 42–52)
HGB BLD-MCNC: 14.9 G/DL (ref 14–18)
IMM GRANULOCYTES # BLD AUTO: 0.06 K/UL (ref 0–0.11)
IMM GRANULOCYTES NFR BLD AUTO: 0.7 % (ref 0–0.9)
LYMPHOCYTES # BLD AUTO: 3 K/UL (ref 1–4.8)
LYMPHOCYTES NFR BLD: 36.3 % (ref 22–41)
MCH RBC QN AUTO: 28.9 PG (ref 27–33)
MCHC RBC AUTO-ENTMCNC: 32.5 G/DL (ref 33.7–35.3)
MCV RBC AUTO: 88.9 FL (ref 81.4–97.8)
MONOCYTES # BLD AUTO: 0.62 K/UL (ref 0–0.85)
MONOCYTES NFR BLD AUTO: 7.5 % (ref 0–13.4)
NEUTROPHILS # BLD AUTO: 4.43 K/UL (ref 1.82–7.42)
NEUTROPHILS NFR BLD: 53.5 % (ref 44–72)
NRBC # BLD AUTO: 0 K/UL
NRBC BLD AUTO-RTO: 0 /100 WBC
PLATELET # BLD AUTO: 162 K/UL (ref 164–446)
PMV BLD AUTO: 10.4 FL (ref 9–12.9)
POTASSIUM SERPL-SCNC: 3.7 MMOL/L (ref 3.6–5.5)
PROT SERPL-MCNC: 6.7 G/DL (ref 6–8.2)
RBC # BLD AUTO: 5.15 M/UL (ref 4.7–6.1)
SODIUM SERPL-SCNC: 138 MMOL/L (ref 135–145)
URATE SERPL-MCNC: 4.2 MG/DL (ref 2.5–8.3)
WBC # BLD AUTO: 8.3 K/UL (ref 4.8–10.8)

## 2017-11-20 PROCEDURE — 84550 ASSAY OF BLOOD/URIC ACID: CPT

## 2017-11-20 PROCEDURE — 80053 COMPREHEN METABOLIC PANEL: CPT

## 2017-11-20 PROCEDURE — 36415 COLL VENOUS BLD VENIPUNCTURE: CPT | Performed by: INTERNAL MEDICINE

## 2017-11-20 PROCEDURE — 99213 OFFICE O/P EST LOW 20 MIN: CPT | Performed by: NURSE PRACTITIONER

## 2017-11-20 PROCEDURE — 85025 COMPLETE CBC W/AUTO DIFF WBC: CPT

## 2017-11-20 ASSESSMENT — ENCOUNTER SYMPTOMS
CONSTIPATION: 0
MYALGIAS: 0
DIARRHEA: 0
NAUSEA: 0
PALPITATIONS: 0
FEVER: 0
CHILLS: 0
WEIGHT LOSS: 0
DIZZINESS: 0
TINGLING: 1
WHEEZING: 0
SHORTNESS OF BREATH: 0
HEADACHES: 0
VOMITING: 0

## 2017-11-20 ASSESSMENT — PAIN SCALES - GENERAL
PAINLEVEL: NO PAIN
PAINLEVEL: NO PAIN

## 2017-11-20 NOTE — PROGRESS NOTES
Subjective:      Amanuel Zamora is a 62 y.o. male who presents for Follow-Up (Prechemo) for CLL.         HPI    Patient seen today in follow-up for CLL with 17 P deletion. Patient is accompanied by his wife and son-in-law for today's appointment. Patient is scheduled to receive cycle #3, day 1 of Obinutuzumab, with Chlorambucil day 1 and 15.     Patient is overall doing very well and tolerating treatment well. He denies any fevers, chills or weight loss. Sexual gain some weight. His appetite is been stable. He does have some fatigue when he has increased activity but it is very tolerable. According to his son-in-law when he gets out to go to the mall he tends to get tired more easily. Otherwise he is doing overall very well.    He denies any shortness of breath, wheezing or coughing. He denies chest pain, palpitations or swelling in his legs. He denies any nausea or vomiting and is having normal formed bowel movements. His last prominent was today. He is voiding without difficulty. He denies any pain. He does have some itching that he is noticed more so at night when going to bed but otherwise denies any rashes. He does have persistent peripheral neuropathy in his feet related to his diabetes.    No Known Allergies  Current Outpatient Prescriptions on File Prior to Visit   Medication Sig Dispense Refill   • metformin (GLUCOPHAGE) 1000 MG tablet Take 1 Tab by mouth 2 times a day, with meals. 180 Tab 3   • acyclovir (ZOVIRAX) 400 MG tablet Take 1 Tab by mouth 2 times a day. 30 Tab 5   • allopurinol (ZYLOPRIM) 100 MG Tab Take 100 mg by mouth every day.     • chlorambucil (LEUKERAN) 2 MG Tab Take 6 Tabs by mouth every 14 days. 12 Tab 3   • metformin ER (GLUCOPHAGE XR) 500 MG TABLET SR 24 HR Take 500 mg by mouth every day.     • ondansetron (ZOFRAN) 4 MG Tab tablet Take 1 Tab by mouth every four hours as needed for Nausea/Vomiting (for nausea, vomiting). 30 Tab 6   • prochlorperazine (COMPAZINE) 10 MG Tab Take 1 Tab by  "mouth every 6 hours as needed (for nausea, vomiting). 30 Tab 6   • aspirin EC (ECOTRIN) 81 MG Tablet Delayed Response Take 81 mg by mouth every day.     • Hyaluronate Sodium 0.2 % Solution by Apply externally route.     • glimepiride (AMARYL) 2 MG Tab Take 2 mg by mouth every morning.       No current facility-administered medications on file prior to visit.        Review of Systems   Constitutional: Positive for malaise/fatigue (more tired with increase activity but very tolerable). Negative for chills, fever and weight loss.   Respiratory: Negative for shortness of breath and wheezing.    Cardiovascular: Negative for chest pain, palpitations and leg swelling.   Gastrointestinal: Negative for constipation, diarrhea, nausea and vomiting.   Genitourinary: Negative for dysuria.   Musculoskeletal: Negative for myalgias.   Skin: Positive for itching (notices itching more so at night). Negative for rash.   Neurological: Positive for tingling (related to diabetes which is stable. ). Negative for dizziness and headaches.          Objective:     /76   Pulse 98   Temp 36.8 °C (98.3 °F)   Resp 16   Ht 1.65 m (5' 4.96\")   Wt 61.7 kg (136 lb)   SpO2 98%   BMI 22.66 kg/m²      Physical Exam   Constitutional: He is oriented to person, place, and time. He appears well-developed and well-nourished. No distress.   HENT:   Head: Normocephalic and atraumatic.   Mouth/Throat: Oropharynx is clear and moist. No oropharyngeal exudate.   Cardiovascular: Normal rate, regular rhythm, normal heart sounds and intact distal pulses.  Exam reveals no gallop and no friction rub.    No murmur heard.  Pulmonary/Chest: Effort normal and breath sounds normal. No respiratory distress. He has no wheezes.   Abdominal: Soft. Bowel sounds are normal. He exhibits no distension. There is no tenderness.   Musculoskeletal: Normal range of motion. He exhibits no edema or tenderness.   Neurological: He is alert and oriented to person, place, and " time.   Skin: Skin is warm and dry. He is not diaphoretic. No erythema.   Psychiatric: He has a normal mood and affect. His behavior is normal.   Vitals reviewed.      Hospital Outpatient Visit on 11/20/2017   Component Date Value Ref Range Status   • WBC 11/20/2017 8.3  4.8 - 10.8 K/uL Final   • RBC 11/20/2017 5.15  4.70 - 6.10 M/uL Final   • Hemoglobin 11/20/2017 14.9  14.0 - 18.0 g/dL Final   • Hematocrit 11/20/2017 45.8  42.0 - 52.0 % Final   • MCV 11/20/2017 88.9  81.4 - 97.8 fL Final   • MCH 11/20/2017 28.9  27.0 - 33.0 pg Final   • MCHC 11/20/2017 32.5* 33.7 - 35.3 g/dL Final   • RDW 11/20/2017 47.4  35.9 - 50.0 fL Final   • Platelet Count 11/20/2017 162* 164 - 446 K/uL Final   • MPV 11/20/2017 10.4  9.0 - 12.9 fL Final   • Neutrophils-Polys 11/20/2017 53.50  44.00 - 72.00 % Final   • Lymphocytes 11/20/2017 36.30  22.00 - 41.00 % Final   • Monocytes 11/20/2017 7.50  0.00 - 13.40 % Final   • Eosinophils 11/20/2017 1.60  0.00 - 6.90 % Final   • Basophils 11/20/2017 0.40  0.00 - 1.80 % Final   • Immature Granulocytes 11/20/2017 0.70  0.00 - 0.90 % Final   • Nucleated RBC 11/20/2017 0.00  /100 WBC Final   • Neutrophils (Absolute) 11/20/2017 4.43  1.82 - 7.42 K/uL Final   • Lymphs (Absolute) 11/20/2017 3.00  1.00 - 4.80 K/uL Final   • Monos (Absolute) 11/20/2017 0.62  0.00 - 0.85 K/uL Final   • Eos (Absolute) 11/20/2017 0.13  0.00 - 0.51 K/uL Final   • Baso (Absolute) 11/20/2017 0.03  0.00 - 0.12 K/uL Final   • Immature Granulocytes (abs) 11/20/2017 0.06  0.00 - 0.11 K/uL Final   • NRBC (Absolute) 11/20/2017 0.00  K/uL Final   • Sodium 11/20/2017 138  135 - 145 mmol/L Final   • Potassium 11/20/2017 3.7  3.6 - 5.5 mmol/L Final   • Chloride 11/20/2017 102  96 - 112 mmol/L Final   • Co2 11/20/2017 27  20 - 33 mmol/L Final   • Anion Gap 11/20/2017 9.0  0.0 - 11.9 Final   • Glucose 11/20/2017 224* 65 - 99 mg/dL Final   • Bun 11/20/2017 17  8 - 22 mg/dL Final   • Creatinine 11/20/2017 0.82  0.50 - 1.40 mg/dL Final   •  Calcium 11/20/2017 9.9  8.5 - 10.5 mg/dL Final   • AST(SGOT) 11/20/2017 10* 12 - 45 U/L Final   • ALT(SGPT) 11/20/2017 6  2 - 50 U/L Final   • Alkaline Phosphatase 11/20/2017 48  30 - 99 U/L Final   • Total Bilirubin 11/20/2017 0.4  0.1 - 1.5 mg/dL Final   • Albumin 11/20/2017 4.1  3.2 - 4.9 g/dL Final   • Total Protein 11/20/2017 6.7  6.0 - 8.2 g/dL Final   • Globulin 11/20/2017 2.6  1.9 - 3.5 g/dL Final   • A-G Ratio 11/20/2017 1.6  g/dL Final   • Uric Acid 11/20/2017 4.2  2.5 - 8.3 mg/dL Final   • GFR If  11/20/2017 >60  >60 mL/min/1.73 m 2 Final   • GFR If Non  11/20/2017 >60  >60 mL/min/1.73 m 2 Final          Assessment/Plan:     1. CLL (chronic lymphocytic leukemia) (CMS-[facility])       Plan  1. Patient is doing very well and tolerating treatment very well for his CLL. He is due to receive 3, day 1 Obinutuzumab with oral Chlorambucil on day 1 and 15. Patient did state that he has to  his chlorambucil today. I did review his labs, CBC and CMP and okay to proceed with treatment as planned.    2. Patient stated that he has a small hard lesion on the bottom of his foot which is nontender. Recommend to wear a corn/callus pad if persistently painful or bothersome to patient.    3. Patient is requesting to know the plan of treatment as he and his wife would like to set flight back to [place] once he's completed with his treatment for CLL. I will discuss further with Dr. Garzon.    4. Patient to continue with weekly labs on day 8 and day 15. We will have him follow-up with Dr. Garzon in approximately 4 weeks with labs. Patient to call sooner if needed.

## 2017-11-21 ENCOUNTER — OUTPATIENT INFUSION SERVICES (OUTPATIENT)
Dept: ONCOLOGY | Facility: MEDICAL CENTER | Age: 62
End: 2017-11-21
Attending: INTERNAL MEDICINE
Payer: COMMERCIAL

## 2017-11-21 ENCOUNTER — TELEPHONE (OUTPATIENT)
Dept: HEMATOLOGY ONCOLOGY | Facility: MEDICAL CENTER | Age: 62
End: 2017-11-21

## 2017-11-21 ENCOUNTER — DOCUMENTATION (OUTPATIENT)
Dept: HEMATOLOGY ONCOLOGY | Facility: MEDICAL CENTER | Age: 62
End: 2017-11-21

## 2017-11-21 VITALS
DIASTOLIC BLOOD PRESSURE: 79 MMHG | HEART RATE: 88 BPM | SYSTOLIC BLOOD PRESSURE: 136 MMHG | RESPIRATION RATE: 18 BRPM | OXYGEN SATURATION: 95 % | BODY MASS INDEX: 22.74 KG/M2 | TEMPERATURE: 97.1 F | HEIGHT: 65 IN | WEIGHT: 136.47 LBS

## 2017-11-21 DIAGNOSIS — C91.10 CLL (CHRONIC LYMPHOCYTIC LEUKEMIA) (HCC): ICD-10-CM

## 2017-11-21 PROCEDURE — 700111 HCHG RX REV CODE 636 W/ 250 OVERRIDE (IP): Performed by: NURSE PRACTITIONER

## 2017-11-21 PROCEDURE — 700105 HCHG RX REV CODE 258

## 2017-11-21 PROCEDURE — 96415 CHEMO IV INFUSION ADDL HR: CPT

## 2017-11-21 PROCEDURE — A9270 NON-COVERED ITEM OR SERVICE: HCPCS | Performed by: NURSE PRACTITIONER

## 2017-11-21 PROCEDURE — 700102 HCHG RX REV CODE 250 W/ 637 OVERRIDE(OP): Performed by: NURSE PRACTITIONER

## 2017-11-21 PROCEDURE — 96375 TX/PRO/DX INJ NEW DRUG ADDON: CPT

## 2017-11-21 PROCEDURE — 700105 HCHG RX REV CODE 258: Performed by: NURSE PRACTITIONER

## 2017-11-21 PROCEDURE — 96413 CHEMO IV INFUSION 1 HR: CPT

## 2017-11-21 RX ORDER — ACETAMINOPHEN 500 MG
1000 TABLET ORAL ONCE
Status: COMPLETED | OUTPATIENT
Start: 2017-11-21 | End: 2017-11-21

## 2017-11-21 RX ORDER — DEXAMETHASONE SODIUM PHOSPHATE 4 MG/ML
20 INJECTION, SOLUTION INTRA-ARTICULAR; INTRALESIONAL; INTRAMUSCULAR; INTRAVENOUS; SOFT TISSUE ONCE
Status: COMPLETED | OUTPATIENT
Start: 2017-11-21 | End: 2017-11-21

## 2017-11-21 RX ORDER — DIPHENHYDRAMINE HYDROCHLORIDE 50 MG/ML
25 INJECTION INTRAMUSCULAR; INTRAVENOUS ONCE
Status: COMPLETED | OUTPATIENT
Start: 2017-11-21 | End: 2017-11-21

## 2017-11-21 RX ADMIN — OBINUTUZUMAB 1000 MG: 1000 INJECTION, SOLUTION, CONCENTRATE INTRAVENOUS at 11:16

## 2017-11-21 RX ADMIN — DEXAMETHASONE SODIUM PHOSPHATE 20 MG: 4 INJECTION, SOLUTION INTRAMUSCULAR; INTRAVENOUS at 10:21

## 2017-11-21 RX ADMIN — DIPHENHYDRAMINE HYDROCHLORIDE 25 MG: 50 INJECTION INTRAMUSCULAR; INTRAVENOUS at 10:21

## 2017-11-21 RX ADMIN — ACETAMINOPHEN 1000 MG: 500 TABLET ORAL at 10:20

## 2017-11-21 ASSESSMENT — PAIN SCALES - GENERAL: PAINLEVEL: NO PAIN

## 2017-11-21 NOTE — TELEPHONE ENCOUNTER
Called patient to let him know that his letter is ready to be picked up. The patient stated that he will be picking his letter up today.

## 2017-11-21 NOTE — PROGRESS NOTES
"Chemotherapy Verification - SECONDARY RN       Height = 64.96\"  Weight = 61.9 kg  BSA = 1.68 m2       Medication: Gazyva  Dose: Set Dose  Calculated Dose: 1000 mg                             I confirm that this process was performed independently.   "

## 2017-11-21 NOTE — PROGRESS NOTES
Patient arrived to clinic for C3D1 Gazyva infusion.  Pt denies any discomfort.  PIV established with good blood return.  Labs reviewed from 11/20 and within normal limits.  Pre medications administered per order.  Spoke with Dayana Uribe to clarify pre-hydration order on treatment plan and per Dayana (confirmed with Dr Garzon) hydration is not needed for this infusion.  Gazyva started at rate of 25ml/hr, verified with 2nd RN per protocol.  Patient tolerating well.  To titrate every 30 minutes per order.  Endorsed care and times to GREGG Root.

## 2017-11-21 NOTE — PROGRESS NOTES
"Pharmacy Chemotherapy Calculation:    Patient name: Amanuel Zamora  DX: CLL    Cycle 3, Day 1 Previous treatment: 10/24/17    Protocol: Chlorambucil + Obinutuzumab   Chlorambucil 0.5 mg/kg PO on Days 1 and 15 - patient taking own   Obinutuzumab 100 mg IV on Day 1, followed by 900 mg IV on Day 2, followed by  1000 mg IV on Days 8 and 15 for cycle 1   Obinutuzumab 1000 mg IV on Day 1 for cycles 2-6  Every 28 days for 6 cycles   NCCN Guidelines for CLL.V.1.2017  Beatriz V, et al. N Engl J Med. 2014 Mar 20;370(12):1101-10.     Allergies:  Review of patient's allergies indicates no known allergies.    /79   Pulse 88   Temp 36.2 °C (97.1 °F)   Resp 18   Ht 1.65 m (5' 4.96\")   Wt 61.9 kg (136 lb 7.4 oz)   SpO2 95%   BMI 22.74 kg/m²   Body surface area is 1.68 meters squared.    11/20/17:  ANC~ 4430 Plt = 162k   Hgb = 14.9     SCr = 0.82mg/dL CrCl ~ 81mL/min   LFT's = WNL TBili = 0.4   Uric acic = 4.2        9/13/2017 09:57   Hepatitis B Surface Antigen Negative   Hepatitis B Cors Ab,IgM Negative       Obinutuzumab (Garzyva) 1000 mg fixed dose   <5% difference, okay to treat with final dose = 1000 mg IV on Day 1      Julianna Montes, PharmD            "

## 2017-11-21 NOTE — PROGRESS NOTES
Pt resting in chair with wife at chairside, report received from Heather RN at 1147. Gazyva infusing at initial rate and pt tolerating infusion, will continue to titrate per protocol. Call light within reach.

## 2017-11-21 NOTE — PROGRESS NOTES
"Nutrition Services: Update    Ht: 65\" Weight: 62kg (136#) BMI: 22.7   *Recent wt change: 6# (4.6%) desirable wt gain (significant) x 1 month    Labs (11/20): glucose: 224 Na: 138 K: 3.7 albumin: 4.1    Met w/ pt this date to follow-up on current intake and appetite, pt reports that his intake is good w/ no current c/o GI distress nor taste changes at this time. Pt states that he hasn't had a problem w/ eating and still experiences hunger daily. Therefore, pt presents w/ a desirable 6# wt gain to put him at a healthy BMI. Otherwise, encouraged pt to continue w/ current intake; therefore, RD to sign off, please re-consult us if any changes in weight or intake occurs. RD available PRN.   "

## 2017-11-21 NOTE — PROGRESS NOTES
Chemotherapy Verification - PRIMARY RN      Height = 165cm  Weight = 61.9kg  BSA = 1.68m2       Medication: Gazyva  Dose: set dose: 1000mg        I confirm this process was performed independently with the BSA and all final chemotherapy dosing calculations congruent.  Any discrepancies of 5% or greater have been addressed with the chemotherapy pharmacist. The resolution of the discrepancy has been documented in the EPIC progress notes.

## 2017-11-21 NOTE — PROGRESS NOTES
"Pharmacy Chemotherapy Verification    Patient Name: Amanuel Zamora  Dx: CLL     Protocol: Obinutuzumab + chlorambucil    *Dosing Reference*  Chlorambucil:  0.5 mg/kg PO days 1 and 15  Obinutuzumab:   100 mg IV on day 1 cycle 1 followed by  900 mg IV on day 2 of cycle 1, followed by  1000 mg IV on days 8 and 15 of cycle 1, followed by  1000 mg IV on day 1 cycles 2-6  Q28 days for 6 cycles   NCCN Guidelines for CLL.V.1.2017  Beatriz MUNOZ et al. N Engl J Med. 2014 Mar 20;370(12):1101-10.      Allergies: Review of patient's allergies indicates no known allergies.  /79   Pulse 88   Temp 36.2 °C (97.1 °F)   Resp 18   Ht 1.65 m (5' 4.96\")   Wt 61.9 kg (136 lb 7.4 oz)   SpO2 95%   BMI 22.74 kg/m²  Body surface area is 1.68 meters squared.    Labs 11/20/17  ANC~ 4430  Plt = 162k    Hgb = 14.9    SCr = 0.82 mg/dL CrCl ~ 80.17 mL/min   AST/ALT/AP = 10/6/48  TBili = 0.4  K = 3.7  Uric acid = 4.2      9/13/2017 09:57   Hepatitis B Surface Antigen Negative   Hepatitis B Cors Ab,IgM Negative         Drug Order   (Drug name, dose, route, IV Fluid & volume, frequency, number of doses) Cycle 3, Day 1   Previous treatment: C2D1 = 10/24/17     Medication = Obinutuzumab (Garzyva)  Base Dose = 1000 mg fixed dose  Calc Dose: Base Dose: NO calc. needed  Final Dose = 1000 mg  Route = IV  Fluid & Volume =  mL  Admin Duration = Titrate as rosalie to a max rate of 400 mg/hr          <5% difference, OK to treat with final dose      By my signature below, I confirm this process was performed independently with the BSA and all final chemotherapy dosing calculations congruent. I have reviewed the above chemotherapy order and that my calculation of the final dose and BSA (when applicable) corroborate those calculations of the  pharmacist. Discrepancies of 5% or greater in the written dose have been addressed and documented within the [facility] Progress notes.    Valerie Harrington, PharmD  "

## 2017-11-21 NOTE — TELEPHONE ENCOUNTER
Confirmed with pt's son, Ana, that they picked up Chlorambucil to be taken on D1 and D15 of chemotherapy.  He had no further questions or needs at this time.

## 2017-11-22 NOTE — PROGRESS NOTES
"Chemotherapy infused per titration protocol to max rate of 100 mL/hr and infusion completed at 1530 without complications or adverse side effects. IV flushed with saline and d/c'd with gauze and coband applied to site. RN confirmed that pt will have labs drawn on day 8 and 15 at labs \"closer to home\", pt confirmed that he has oral chemotherapy and will take as prescribed by MD. No further appts with [facility] at this time, pt to f/u with MD on 12/18/17. Pt left ambulatory with spouse in no distress at 1545  "

## 2017-11-28 ENCOUNTER — HOSPITAL ENCOUNTER (OUTPATIENT)
Dept: LAB | Facility: MEDICAL CENTER | Age: 62
End: 2017-11-28
Attending: INTERNAL MEDICINE

## 2017-11-28 LAB
ERYTHROCYTE [DISTWIDTH] IN BLOOD BY AUTOMATED COUNT: 46.4 FL (ref 35.9–50)
HCT VFR BLD AUTO: 46.4 % (ref 42–52)
HGB BLD-MCNC: 15.2 G/DL (ref 14–18)
MCH RBC QN AUTO: 28.6 PG (ref 27–33)
MCHC RBC AUTO-ENTMCNC: 32.8 G/DL (ref 33.7–35.3)
MCV RBC AUTO: 87.2 FL (ref 81.4–97.8)
PLATELET # BLD AUTO: 139 K/UL (ref 164–446)
PMV BLD AUTO: 11.2 FL (ref 9–12.9)
RBC # BLD AUTO: 5.32 M/UL (ref 4.7–6.1)
WBC # BLD AUTO: 7.3 K/UL (ref 4.8–10.8)

## 2017-11-28 PROCEDURE — 36415 COLL VENOUS BLD VENIPUNCTURE: CPT

## 2017-11-28 PROCEDURE — 85027 COMPLETE CBC AUTOMATED: CPT

## 2017-11-30 ENCOUNTER — TELEPHONE (OUTPATIENT)
Dept: HEMATOLOGY ONCOLOGY | Facility: MEDICAL CENTER | Age: 62
End: 2017-11-30

## 2017-11-30 NOTE — TELEPHONE ENCOUNTER
Received My Chart request regarding refill of Leukeran (chlorambucil) [PO chemo] which patient has been taking days 1 and 15 of each cycle    Discussed with Dr. Garzon  Since the disease has shown good response, he will discontinue this medication at this time, no refill needed    Patient notified per My Chart

## 2017-12-06 NOTE — PROGRESS NOTES
Patient is currently enrolled in Gazyva replacement medication patient assistance program.  Patient credited for the following dates:  11/21/17       Perri Perlaitzel   Pharmacy Patient Advocate

## 2017-12-15 DIAGNOSIS — C91.10 CLL (CHRONIC LYMPHOCYTIC LEUKEMIA) (HCC): ICD-10-CM

## 2017-12-15 DIAGNOSIS — Z51.11 ENCOUNTER FOR ANTINEOPLASTIC CHEMOTHERAPY: ICD-10-CM

## 2017-12-18 ENCOUNTER — NON-PROVIDER VISIT (OUTPATIENT)
Dept: HEMATOLOGY ONCOLOGY | Facility: MEDICAL CENTER | Age: 62
End: 2017-12-18

## 2017-12-18 ENCOUNTER — HOSPITAL ENCOUNTER (OUTPATIENT)
Facility: MEDICAL CENTER | Age: 62
End: 2017-12-18
Attending: INTERNAL MEDICINE
Payer: COMMERCIAL

## 2017-12-18 ENCOUNTER — OFFICE VISIT (OUTPATIENT)
Dept: HEMATOLOGY ONCOLOGY | Facility: MEDICAL CENTER | Age: 62
End: 2017-12-18

## 2017-12-18 VITALS
OXYGEN SATURATION: 98 % | HEIGHT: 61 IN | RESPIRATION RATE: 18 BRPM | DIASTOLIC BLOOD PRESSURE: 82 MMHG | BODY MASS INDEX: 26.24 KG/M2 | HEART RATE: 95 BPM | WEIGHT: 139 LBS | SYSTOLIC BLOOD PRESSURE: 126 MMHG | TEMPERATURE: 97 F

## 2017-12-18 VITALS
RESPIRATION RATE: 18 BRPM | OXYGEN SATURATION: 98 % | HEIGHT: 65 IN | TEMPERATURE: 97 F | HEART RATE: 95 BPM | BODY MASS INDEX: 23.16 KG/M2 | SYSTOLIC BLOOD PRESSURE: 126 MMHG | WEIGHT: 139 LBS | DIASTOLIC BLOOD PRESSURE: 82 MMHG

## 2017-12-18 DIAGNOSIS — C91.10 CLL (CHRONIC LYMPHOCYTIC LEUKEMIA) (HCC): ICD-10-CM

## 2017-12-18 DIAGNOSIS — Z51.11 ENCOUNTER FOR ANTINEOPLASTIC CHEMOTHERAPY: ICD-10-CM

## 2017-12-18 LAB
ALBUMIN SERPL BCP-MCNC: 4.5 G/DL (ref 3.2–4.9)
ALBUMIN/GLOB SERPL: 1.9 G/DL
ALP SERPL-CCNC: 44 U/L (ref 30–99)
ALT SERPL-CCNC: 10 U/L (ref 2–50)
ANION GAP SERPL CALC-SCNC: 10 MMOL/L (ref 0–11.9)
AST SERPL-CCNC: 12 U/L (ref 12–45)
BASOPHILS # BLD AUTO: 0.4 % (ref 0–1.8)
BASOPHILS # BLD: 0.03 K/UL (ref 0–0.12)
BILIRUB SERPL-MCNC: 0.5 MG/DL (ref 0.1–1.5)
BUN SERPL-MCNC: 13 MG/DL (ref 8–22)
CALCIUM SERPL-MCNC: 10.2 MG/DL (ref 8.5–10.5)
CHLORIDE SERPL-SCNC: 101 MMOL/L (ref 96–112)
CO2 SERPL-SCNC: 29 MMOL/L (ref 20–33)
CREAT SERPL-MCNC: 0.89 MG/DL (ref 0.5–1.4)
EOSINOPHIL # BLD AUTO: 0.11 K/UL (ref 0–0.51)
EOSINOPHIL NFR BLD: 1.4 % (ref 0–6.9)
ERYTHROCYTE [DISTWIDTH] IN BLOOD BY AUTOMATED COUNT: 43.7 FL (ref 35.9–50)
GFR SERPL CREATININE-BSD FRML MDRD: >60 ML/MIN/1.73 M 2
GLOBULIN SER CALC-MCNC: 2.4 G/DL (ref 1.9–3.5)
GLUCOSE SERPL-MCNC: 223 MG/DL (ref 65–99)
HCT VFR BLD AUTO: 49.5 % (ref 42–52)
HGB BLD-MCNC: 16.4 G/DL (ref 14–18)
IMM GRANULOCYTES # BLD AUTO: 0.08 K/UL (ref 0–0.11)
IMM GRANULOCYTES NFR BLD AUTO: 1 % (ref 0–0.9)
LYMPHOCYTES # BLD AUTO: 2.79 K/UL (ref 1–4.8)
LYMPHOCYTES NFR BLD: 34.5 % (ref 22–41)
MCH RBC QN AUTO: 28.6 PG (ref 27–33)
MCHC RBC AUTO-ENTMCNC: 33.1 G/DL (ref 33.7–35.3)
MCV RBC AUTO: 86.4 FL (ref 81.4–97.8)
MONOCYTES # BLD AUTO: 0.75 K/UL (ref 0–0.85)
MONOCYTES NFR BLD AUTO: 9.3 % (ref 0–13.4)
NEUTROPHILS # BLD AUTO: 4.33 K/UL (ref 1.82–7.42)
NEUTROPHILS NFR BLD: 53.4 % (ref 44–72)
NRBC # BLD AUTO: 0 K/UL
NRBC BLD AUTO-RTO: 0 /100 WBC
PLATELET # BLD AUTO: 185 K/UL (ref 164–446)
PMV BLD AUTO: 10 FL (ref 9–12.9)
POTASSIUM SERPL-SCNC: 3.7 MMOL/L (ref 3.6–5.5)
PROT SERPL-MCNC: 6.9 G/DL (ref 6–8.2)
RBC # BLD AUTO: 5.73 M/UL (ref 4.7–6.1)
SODIUM SERPL-SCNC: 140 MMOL/L (ref 135–145)
URATE SERPL-MCNC: 4.6 MG/DL (ref 2.5–8.3)
WBC # BLD AUTO: 8.1 K/UL (ref 4.8–10.8)

## 2017-12-18 PROCEDURE — 80053 COMPREHEN METABOLIC PANEL: CPT

## 2017-12-18 PROCEDURE — 36415 COLL VENOUS BLD VENIPUNCTURE: CPT | Performed by: INTERNAL MEDICINE

## 2017-12-18 PROCEDURE — 84550 ASSAY OF BLOOD/URIC ACID: CPT

## 2017-12-18 PROCEDURE — 99214 OFFICE O/P EST MOD 30 MIN: CPT | Performed by: INTERNAL MEDICINE

## 2017-12-18 PROCEDURE — 85025 COMPLETE CBC W/AUTO DIFF WBC: CPT

## 2017-12-18 RX ORDER — DIPHENHYDRAMINE HYDROCHLORIDE 50 MG/ML
25 INJECTION INTRAMUSCULAR; INTRAVENOUS ONCE
Status: CANCELLED | OUTPATIENT
Start: 2017-12-19 | End: 2017-12-19

## 2017-12-18 RX ORDER — SODIUM CHLORIDE 9 MG/ML
1000 INJECTION, SOLUTION INTRAVENOUS CONTINUOUS
Status: CANCELLED | OUTPATIENT
Start: 2017-12-19

## 2017-12-18 RX ORDER — ACETAMINOPHEN 500 MG
1000 TABLET ORAL ONCE
Status: CANCELLED | OUTPATIENT
Start: 2017-12-19 | End: 2017-12-19

## 2017-12-18 RX ORDER — ONDANSETRON 2 MG/ML
4 INJECTION INTRAMUSCULAR; INTRAVENOUS
Status: CANCELLED | OUTPATIENT
Start: 2017-12-19

## 2017-12-18 RX ORDER — ONDANSETRON 8 MG/1
8 TABLET, ORALLY DISINTEGRATING ORAL
Status: CANCELLED | OUTPATIENT
Start: 2017-12-19

## 2017-12-18 RX ORDER — MEPERIDINE HYDROCHLORIDE 25 MG/ML
12.5 INJECTION INTRAMUSCULAR; INTRAVENOUS; SUBCUTANEOUS PRN
Status: CANCELLED | OUTPATIENT
Start: 2017-12-19

## 2017-12-18 RX ORDER — DEXAMETHASONE SODIUM PHOSPHATE 4 MG/ML
20 INJECTION, SOLUTION INTRA-ARTICULAR; INTRALESIONAL; INTRAMUSCULAR; INTRAVENOUS; SOFT TISSUE ONCE
Status: CANCELLED | OUTPATIENT
Start: 2017-12-19 | End: 2017-12-19

## 2017-12-18 RX ORDER — PROCHLORPERAZINE MALEATE 10 MG
10 TABLET ORAL EVERY 6 HOURS PRN
Status: CANCELLED | OUTPATIENT
Start: 2017-12-19

## 2017-12-18 RX ORDER — LIDOCAINE HYDROCHLORIDE 10 MG/ML
0.5 INJECTION, SOLUTION INFILTRATION; PERINEURAL SEE ADMIN INSTRUCTIONS
Status: CANCELLED | OUTPATIENT
Start: 2017-12-19

## 2017-12-18 ASSESSMENT — PAIN SCALES - GENERAL
PAINLEVEL: NO PAIN
PAINLEVEL: NO PAIN

## 2017-12-18 NOTE — PROGRESS NOTES
Consult Note: Oncology    Date of consultation: 12/18/2017     Referring provider: The patient is here by the kind referral of No ref. provider found    Reason for consultation:   CC: Chronic lymphocytic leukemia     History of presenting illness:   February 28, 2013 WBC 7.8  March 4, 2016. WBC 60  March 16, 2017 WBC 42.4  May 22, 2017   July 21, 2017 patient was seen by oncologist Dr. Mendez Mariee at [facility] hematology clinic. He said that he has had elevated white cell count for a long time and has been in watch and wait pattern. He was recommended treatment based on final FISH results for B cell lymphoproliferative disorder.  July 31, 2017 . Absolute lymphocyte count 355  August 4, 2017. 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 CD5 positive, CD19 positive CD20 dim partial 23 positive and Kappa +, no expression of , CD10, CD20 8.  August 28, 2017 . Absolute lymphocyte count 401. Patient was seen in follow-up at [facility]  September 12, 2017. Patient started on high-dose dexamethasone  September 23, 2017. Patient received cycle 1 day 1 of chlorambucil and Obinutuzumab  September 29, 2017 cycle 1 day 8 of Obinutuzumab held for thrombocytopenia.  October 6, 2017. Plan for cycle 1 day 15 of chlorambucil and Obinutuzumab  October 24, 2017. Cycle 2 of chlorambucil and Obinutuzumab  November 20, 2017. Cycle 3 of Obinutuzumab  December 18, 2017. Cycle 4 of Obinutuzumab    All relevant medical records available in [facility] were reviewed and are summarized above.    Amanuel Zamora  is a 62 y.o. year old male who is visiting family in the [place] was seen in clinic at [facility] for leukocytosis. Workup showed he has CLL with 17 P deletion. The patient was found to have a rapid doubling time and in combination with 17 P deletion he was recommended to start therapy. However he does not have health insurance  and is being out-of-pocket. He also has limited time in the [place] and was not able to start this treatment at [facility]. The patient was referred to for [facility] cancer centre for insurance reasons and is here to start treatment.    Interval istory  Interval History:  Initially seen in our clinic on September 12, 2017  Said Noah is a 62 y.o. male who presents for follow-up. Denies any acute complaints. He is happy and states this is the best he has felt in a long time.        Past Medical History:    Past Medical History:   Diagnosis Date   • Diabetes mellitus (CMS-[facility])          Allergies:  Review of patient's allergies indicates not on file.    Medications:    Current Outpatient Prescriptions   Medication Sig Dispense Refill   • chlorambucil (LEUKERAN) 2 MG Tab Take 6 Tabs by mouth every 14 days. 12 Tab 3   • metformin ER (GLUCOPHAGE XR) 500 MG TABLET SR 24 HR Take 500 mg by mouth every day.     • ondansetron (ZOFRAN) 4 MG Tab tablet Take 1 Tab by mouth every four hours as needed for Nausea/Vomiting (for nausea, vomiting). 30 Tab 6   • prochlorperazine (COMPAZINE) 10 MG Tab Take 1 Tab by mouth every 6 hours as needed (for nausea, vomiting). 30 Tab 6   • aspirin EC (ECOTRIN) 81 MG Tablet Delayed Response Take 81 mg by mouth every day.     • Hyaluronate Sodium 0.2 % Solution by Apply externally route.     • glimepiride (AMARYL) 2 MG Tab Take 2 mg by mouth every morning.     • metformin (GLUCOPHAGE) 1000 MG tablet Take 1 Tab by mouth 2 times a day, with meals. 180 Tab 3   • acyclovir (ZOVIRAX) 400 MG tablet Take 1 Tab by mouth 2 times a day. 30 Tab 5     No current facility-administered medications for this visit.        Social History:     Social History     Social History   • Marital status:      Spouse name: N/A   • Number of children: N/A   • Years of education: N/A     Occupational History   • retired       Social History Main Topics   • Smoking status: Never Smoker   • Smokeless tobacco:  "Never Used   • Alcohol use No   • Drug use: No   • Sexual activity: Yes     Partners: Female     Other Topics Concern   • Not on file     Social History Narrative   • No narrative on file       Family History:     Family History   Problem Relation Age of Onset   • No Known Problems Mother    • No Known Problems Father        Review of Systems:  Constitutional: No fever, chills, weight loss, malaise/fatigue.      All other review of systems are negative except what was mentioned above in the HPI.      Physical Exam:  Vitals:   /82   Pulse 95   Temp 36.1 °C (97 °F)   Resp 18   Ht 1.548 m (5' 0.96\")   Wt 63 kg (139 lb)   SpO2 98%   BMI 26.30 kg/m²     ECOG performance status is 1 [Restricted in physically strenuous activity but ambulatory and able to carry out work of a light or sedentary nature, e.g., light house work, office work]  General: Not in acute distress, emaciated with temporal wasting  HEENT: No pallor, icterus. Oropharynx clear.   Neck: Supple, no palpable masses.  Lymph nodes: No palpable cervical, supraclavicular, axillary or inguinal lymphadenopathy.    CVS: regular rate and rhythm, no rubs or gallops  RESP: Clear to auscultate bilaterally, no wheezing or crackles.   ABD: Soft,  non distended, positive bowel sounds, palpable splenomegaly  EXT: No edema or cyanosis  CNS: Alert and oriented x3, No focal deficits.  Skin- No rash      Labs:   Recent Labs      12/18/17   1035   WBC  8.1   RBC  5.73   HEMOGLOBIN  16.4   HEMATOCRIT  49.5   MCV  86.4   MCH  28.6   MCHC  33.1*   RDW  43.7   PLATELETCT  185   MPV  10.0         Imaging:    Assessment and Plan:  -CLL  -Zamudio stage II with splenomegaly  -17p deletion/TP53 mutation  -IGHV Mutated  -This is a very unusual situation. The patient 1st presented stating he can stay in the [place] until October 10. However today he told me that mistake has been made and he can stay until January 2018.He has requested for an extension of stay based on " medical necessity.  -Patient is poor risk because of 17p deletion  -Ideally the patient needs to be started on first-line chemoimmunotherapy followed by maintenance therapy or started on Ibrutininb. However the patient's social situation is that he is paying out of pocket for everything and cannot afford expensive medical care.   -Acyclovir for viral prophylaxis.  -September 12, 2017. Patient started on high-dose dexamethasone  -September 23, 2017. Patient received cycle 1 day 1 of chlorambucil and Obinutuzumab  -September 29, 2017 cycle 1 day 8 of Obinutuzumab held for thrombocytopenia.  -Patient plan for chlorambucil and Obinutuzumab in the morning cycle 1 day 15.  -Start cycle 4 of treatment with Obintuzumab tomorrow.Chemotherapy requiring intensive monitoring for toxicity.  -Plan for 6 cycles  -Check labs weekly    -All medical records available in [facility] were reviewed and are summarized above.  -All labs and/or imaging studies mentioned in the note above were reviewed with and explained to the patient as a pertain to medical decision making.      He agreed and verbalized his agreement and understanding with the current plan.  I answered all questions and concerns he has at this time.      Please note that this dictation was created using voice recognition software. I have made every reasonable attempt to correct obvious errors, but I expect that there are errors of grammar and possibly content that I did not discover before finalizing the note.      SIGNATURES:  Shaila Garzon

## 2017-12-18 NOTE — PROGRESS NOTES
Amanuel Zamora is a 62 y.o. male here for a non-provider visit for: Lab Draws  on 12/18/2017 at 11:03 AM    Procedure Performed: Venipuncture     Anatomical site: Right Antecubital Area (AC)    Equipment used: 21g    Labs drawn: cbc, cmp, uric acid    Ordering Provider: Dr. LOUIS Garzon    Ravi By: Nate Harp, Xavier Ass't    Successful lab draw, no complications.

## 2017-12-19 ENCOUNTER — OUTPATIENT INFUSION SERVICES (OUTPATIENT)
Dept: ONCOLOGY | Facility: MEDICAL CENTER | Age: 62
End: 2017-12-19
Attending: INTERNAL MEDICINE
Payer: COMMERCIAL

## 2017-12-19 VITALS
SYSTOLIC BLOOD PRESSURE: 140 MMHG | BODY MASS INDEX: 26.24 KG/M2 | DIASTOLIC BLOOD PRESSURE: 76 MMHG | HEART RATE: 78 BPM | WEIGHT: 138.67 LBS | OXYGEN SATURATION: 96 % | RESPIRATION RATE: 18 BRPM | TEMPERATURE: 98 F

## 2017-12-19 DIAGNOSIS — C91.10 CLL (CHRONIC LYMPHOCYTIC LEUKEMIA) (HCC): ICD-10-CM

## 2017-12-19 PROCEDURE — 96375 TX/PRO/DX INJ NEW DRUG ADDON: CPT

## 2017-12-19 PROCEDURE — 700102 HCHG RX REV CODE 250 W/ 637 OVERRIDE(OP): Performed by: INTERNAL MEDICINE

## 2017-12-19 PROCEDURE — A9270 NON-COVERED ITEM OR SERVICE: HCPCS | Performed by: INTERNAL MEDICINE

## 2017-12-19 PROCEDURE — 96415 CHEMO IV INFUSION ADDL HR: CPT

## 2017-12-19 PROCEDURE — 700105 HCHG RX REV CODE 258: Performed by: INTERNAL MEDICINE

## 2017-12-19 PROCEDURE — 96413 CHEMO IV INFUSION 1 HR: CPT

## 2017-12-19 PROCEDURE — 700105 HCHG RX REV CODE 258

## 2017-12-19 PROCEDURE — 700111 HCHG RX REV CODE 636 W/ 250 OVERRIDE (IP): Performed by: INTERNAL MEDICINE

## 2017-12-19 RX ORDER — SODIUM CHLORIDE 9 MG/ML
1000 INJECTION, SOLUTION INTRAVENOUS CONTINUOUS
Status: DISCONTINUED | OUTPATIENT
Start: 2017-12-19 | End: 2017-12-19 | Stop reason: HOSPADM

## 2017-12-19 RX ORDER — DIPHENHYDRAMINE HYDROCHLORIDE 50 MG/ML
25 INJECTION INTRAMUSCULAR; INTRAVENOUS ONCE
Status: COMPLETED | OUTPATIENT
Start: 2017-12-19 | End: 2017-12-19

## 2017-12-19 RX ORDER — DEXAMETHASONE SODIUM PHOSPHATE 4 MG/ML
20 INJECTION, SOLUTION INTRA-ARTICULAR; INTRALESIONAL; INTRAMUSCULAR; INTRAVENOUS; SOFT TISSUE ONCE
Status: COMPLETED | OUTPATIENT
Start: 2017-12-19 | End: 2017-12-19

## 2017-12-19 RX ORDER — ACETAMINOPHEN 500 MG
1000 TABLET ORAL ONCE
Status: COMPLETED | OUTPATIENT
Start: 2017-12-19 | End: 2017-12-19

## 2017-12-19 RX ADMIN — DEXAMETHASONE SODIUM PHOSPHATE 20 MG: 4 INJECTION, SOLUTION INTRAMUSCULAR; INTRAVENOUS at 14:28

## 2017-12-19 RX ADMIN — OBINUTUZUMAB 1000 MG: 1000 INJECTION, SOLUTION, CONCENTRATE INTRAVENOUS at 15:17

## 2017-12-19 RX ADMIN — ACETAMINOPHEN 1000 MG: 500 TABLET ORAL at 14:27

## 2017-12-19 RX ADMIN — DIPHENHYDRAMINE HYDROCHLORIDE 25 MG: 50 INJECTION INTRAMUSCULAR; INTRAVENOUS at 14:42

## 2017-12-19 RX ADMIN — SODIUM CHLORIDE 1000 ML: 9 INJECTION, SOLUTION INTRAVENOUS at 14:17

## 2017-12-19 ASSESSMENT — PAIN SCALES - GENERAL: PAINLEVEL: NO PAIN

## 2017-12-19 NOTE — PROGRESS NOTES
Chemotherapy Verification - PRIMARY RN      Height = 154.8 cm  Weight = 62.9 kg  BSA = 1.64 m2       Medication: Gazyva  Dose: set dose  Calculated Dose: 1000 mg                             (In mg/m2, AUC, mg/kg)         I confirm this process was performed independently with the BSA and all final chemotherapy dosing calculations congruent.  Any discrepancies of 5% or greater have been addressed with the chemotherapy pharmacist. The resolution of the discrepancy has been documented in the EPIC progress notes.

## 2017-12-19 NOTE — PROGRESS NOTES
Chemotherapy Verification - SECONDARY RN       Height = 154.8 cm  Weight = 62.9 kg  BSA = 1.64 m2       Medication: Gazyva  Dose: 1,000 mg set dose  Calculated Dose: 1,000 mg set dose                             (In mg/m2, AUC, mg/kg)       I confirm that this process was performed independently.

## 2017-12-19 NOTE — PROGRESS NOTES
Pharmacy Chemotherapy Calculation:    Patient name: Amanuel Zamora  DX: CLL    Cycle 4  Previous treatment: 11/21/17    Protocol: Chlorambucil + Obinutuzumab   Chlorambucil 0.5 mg/kg PO on Days 1 and 15 - patient taking own   Obinutuzumab 100 mg IV on Day 1, followed by 900 mg IV on Day 2, followed by  1000 mg IV on Days 8 and 15 for cycle 1   Obinutuzumab 1000 mg IV on Day 1 for cycles 2-6  Every 28 days for 6 cycles   NCCN Guidelines for CLL.V.1.2017  Beatriz V, et al. N Engl J Med. 2014 Mar 20;370(12):1101-10.     Allergies:  Review of patient's allergies indicates no known allergies.    /76   Pulse 78   Temp 36.7 °C (98 °F)   Resp 18   Wt 62.9 kg (138 lb 10.7 oz)   SpO2 96%   BMI 26.24 kg/m²   Body surface area is 1.64 meters squared.    12/18/17:  ANC~ 4330 Plt = 185k   Hgb = 16.4     SCr = 0.89mg/dL CrCl ~ 76mL/min   LFT's = WNL TBili = 0.5   Uric acic = 4.6       9/13/2017 09:57   Hepatitis B Surface Antigen Negative   Hepatitis B Cors Ab,IgM Negative       Obinutuzumab (Kaizyva) 1000 mg fixed dose   <5% difference, okay to treat with final dose = 1000 mg IV on      Rico StreetD

## 2017-12-19 NOTE — PROGRESS NOTES
Pharmacy Chemotherapy Verification    Patient Name: Amanuel Zamora  Dx: CLL     Protocol: Obinutuzumab + chlorambucil    *Dosing Reference*  Chlorambucil:  0.5 mg/kg PO days 1 and 15  Obinutuzumab:   100 mg IV on day 1 cycle 1 followed by  900 mg IV on day 2 of cycle 1, followed by  1000 mg IV on days 8 and 15 of cycle 1, followed by  1000 mg IV on day 1 cycles 2-6  Q28 days for 6 cycles   NCCN Guidelines for CLL.V.1.2017  Beatriz MUNOZ et al. N Engl J Med. 2014 Mar 20;370(12):1101-10.      Allergies: Review of patient's allergies indicates no known allergies.  /76   Pulse 78   Temp 36.7 °C (98 °F)   Resp 18   Wt 62.9 kg (138 lb 10.7 oz)   SpO2 96%   BMI 26.24 kg/m²  Body surface area is 1.64 meters squared.    Labs 12/18/17:  ANC~ 4330 Plt = 185k   Hgb = 16.4     SCr = 0.89mg/dL CrCl ~ 76 mL/min   LFT's = WNL TBili = 0.5    Uric Acid = 4.6   K+ = 3.7      9/13/2017 09:57   Hepatitis B Surface Antigen Negative   Hepatitis B Cors Ab,IgM Negative         Drug Order   (Drug name, dose, route, IV Fluid & volume, frequency, number of doses) Cycle 4, Day 1   Previous treatment: C3D1 = 11/21/17     Medication = Obinutuzumab (Gazyva)  Base Dose = 1000 mg fixed dose  Calc Dose: Base Dose: NO calc. needed  Final Dose = 1000 mg  Route = IV  Fluid & Volume =  mL  Admin Duration = Titrate as rosalie to a max rate of 400 mg/hr          <5% difference, OK to treat with final dose      By my signature below, I confirm this process was performed independently with the BSA and all final chemotherapy dosing calculations congruent. I have reviewed the above chemotherapy order and that my calculation of the final dose and BSA (when applicable) corroborate those calculations of the  pharmacist. Discrepancies of 5% or greater in the written dose have been addressed and documented within the [facility] Progress notes.    Sarahi Hall, RicoD

## 2017-12-20 NOTE — PROGRESS NOTES
Returns for Gazyva.  Doing well.  Voices no c/o.  Saw MD yesterday and states hopes to go home after February dosing.  Premeds and tx infused per ramp protocol without rx. DC to care of wife.

## 2017-12-31 DIAGNOSIS — E11.9 DIABETES MELLITUS TYPE 2 IN NONOBESE (HCC): ICD-10-CM

## 2018-01-11 DIAGNOSIS — C91.10 CLL (CHRONIC LYMPHOCYTIC LEUKEMIA) (HCC): ICD-10-CM

## 2018-01-15 ENCOUNTER — HOSPITAL ENCOUNTER (OUTPATIENT)
Facility: MEDICAL CENTER | Age: 63
End: 2018-01-15
Attending: INTERNAL MEDICINE
Payer: COMMERCIAL

## 2018-01-15 ENCOUNTER — OFFICE VISIT (OUTPATIENT)
Dept: HEMATOLOGY ONCOLOGY | Facility: MEDICAL CENTER | Age: 63
End: 2018-01-15

## 2018-01-15 ENCOUNTER — NON-PROVIDER VISIT (OUTPATIENT)
Dept: HEMATOLOGY ONCOLOGY | Facility: MEDICAL CENTER | Age: 63
End: 2018-01-15

## 2018-01-15 VITALS
DIASTOLIC BLOOD PRESSURE: 80 MMHG | BODY MASS INDEX: 26.6 KG/M2 | WEIGHT: 140.87 LBS | HEIGHT: 61 IN | TEMPERATURE: 98.2 F | SYSTOLIC BLOOD PRESSURE: 122 MMHG | OXYGEN SATURATION: 95 % | RESPIRATION RATE: 16 BRPM | HEART RATE: 96 BPM

## 2018-01-15 VITALS
HEART RATE: 96 BPM | OXYGEN SATURATION: 95 % | RESPIRATION RATE: 16 BRPM | BODY MASS INDEX: 26.45 KG/M2 | WEIGHT: 140.1 LBS | TEMPERATURE: 98.2 F | HEIGHT: 61 IN | SYSTOLIC BLOOD PRESSURE: 122 MMHG | DIASTOLIC BLOOD PRESSURE: 80 MMHG

## 2018-01-15 DIAGNOSIS — L29.9 ITCHING: ICD-10-CM

## 2018-01-15 DIAGNOSIS — R52 PAIN: ICD-10-CM

## 2018-01-15 DIAGNOSIS — C91.10 CLL (CHRONIC LYMPHOCYTIC LEUKEMIA) (HCC): ICD-10-CM

## 2018-01-15 DIAGNOSIS — Z51.11 ENCOUNTER FOR ANTINEOPLASTIC CHEMOTHERAPY: ICD-10-CM

## 2018-01-15 DIAGNOSIS — G63 POLYNEUROPATHY ASSOCIATED WITH UNDERLYING DISEASE (HCC): ICD-10-CM

## 2018-01-15 DIAGNOSIS — E11.9 DIABETES MELLITUS TYPE 2 IN NONOBESE (HCC): ICD-10-CM

## 2018-01-15 LAB
ALBUMIN SERPL BCP-MCNC: 4.7 G/DL (ref 3.2–4.9)
ALBUMIN/GLOB SERPL: 2.1 G/DL
ALP SERPL-CCNC: 47 U/L (ref 30–99)
ALT SERPL-CCNC: 10 U/L (ref 2–50)
ANION GAP SERPL CALC-SCNC: 7 MMOL/L (ref 0–11.9)
AST SERPL-CCNC: 10 U/L (ref 12–45)
BASOPHILS # BLD AUTO: 0.3 % (ref 0–1.8)
BASOPHILS # BLD: 0.03 K/UL (ref 0–0.12)
BILIRUB SERPL-MCNC: 0.5 MG/DL (ref 0.1–1.5)
BUN SERPL-MCNC: 14 MG/DL (ref 8–22)
CALCIUM SERPL-MCNC: 9.9 MG/DL (ref 8.5–10.5)
CHLORIDE SERPL-SCNC: 101 MMOL/L (ref 96–112)
CO2 SERPL-SCNC: 29 MMOL/L (ref 20–33)
CREAT SERPL-MCNC: 0.99 MG/DL (ref 0.5–1.4)
EOSINOPHIL # BLD AUTO: 0.11 K/UL (ref 0–0.51)
EOSINOPHIL NFR BLD: 1.1 % (ref 0–6.9)
ERYTHROCYTE [DISTWIDTH] IN BLOOD BY AUTOMATED COUNT: 43.8 FL (ref 35.9–50)
GLOBULIN SER CALC-MCNC: 2.2 G/DL (ref 1.9–3.5)
GLUCOSE SERPL-MCNC: 195 MG/DL (ref 65–99)
HCT VFR BLD AUTO: 49 % (ref 42–52)
HGB BLD-MCNC: 16.5 G/DL (ref 14–18)
IMM GRANULOCYTES # BLD AUTO: 0.04 K/UL (ref 0–0.11)
IMM GRANULOCYTES NFR BLD AUTO: 0.4 % (ref 0–0.9)
LYMPHOCYTES # BLD AUTO: 2.85 K/UL (ref 1–4.8)
LYMPHOCYTES NFR BLD: 28.6 % (ref 22–41)
MCH RBC QN AUTO: 28.5 PG (ref 27–33)
MCHC RBC AUTO-ENTMCNC: 33.7 G/DL (ref 33.7–35.3)
MCV RBC AUTO: 84.8 FL (ref 81.4–97.8)
MONOCYTES # BLD AUTO: 0.71 K/UL (ref 0–0.85)
MONOCYTES NFR BLD AUTO: 7.1 % (ref 0–13.4)
NEUTROPHILS # BLD AUTO: 6.24 K/UL (ref 1.82–7.42)
NEUTROPHILS NFR BLD: 62.5 % (ref 44–72)
NRBC # BLD AUTO: 0 K/UL
NRBC BLD-RTO: 0 /100 WBC
PLATELET # BLD AUTO: 175 K/UL (ref 164–446)
PMV BLD AUTO: 10 FL (ref 9–12.9)
POTASSIUM SERPL-SCNC: 3.7 MMOL/L (ref 3.6–5.5)
PROT SERPL-MCNC: 6.9 G/DL (ref 6–8.2)
RBC # BLD AUTO: 5.78 M/UL (ref 4.7–6.1)
SODIUM SERPL-SCNC: 137 MMOL/L (ref 135–145)
URATE SERPL-MCNC: 5.3 MG/DL (ref 2.5–8.3)
WBC # BLD AUTO: 10 K/UL (ref 4.8–10.8)

## 2018-01-15 PROCEDURE — 99214 OFFICE O/P EST MOD 30 MIN: CPT | Performed by: NURSE PRACTITIONER

## 2018-01-15 PROCEDURE — 85025 COMPLETE CBC W/AUTO DIFF WBC: CPT

## 2018-01-15 PROCEDURE — 80053 COMPREHEN METABOLIC PANEL: CPT

## 2018-01-15 PROCEDURE — 84550 ASSAY OF BLOOD/URIC ACID: CPT

## 2018-01-15 PROCEDURE — 36415 COLL VENOUS BLD VENIPUNCTURE: CPT | Performed by: INTERNAL MEDICINE

## 2018-01-15 RX ORDER — LIDOCAINE HYDROCHLORIDE 10 MG/ML
0.5 INJECTION, SOLUTION INFILTRATION; PERINEURAL SEE ADMIN INSTRUCTIONS
Status: CANCELLED | OUTPATIENT
Start: 2018-01-16

## 2018-01-15 RX ORDER — ACETAMINOPHEN 500 MG
1000 TABLET ORAL ONCE
Status: CANCELLED | OUTPATIENT
Start: 2018-01-16 | End: 2018-01-16

## 2018-01-15 RX ORDER — DIPHENHYDRAMINE HYDROCHLORIDE 50 MG/ML
25 INJECTION INTRAMUSCULAR; INTRAVENOUS ONCE
Status: CANCELLED | OUTPATIENT
Start: 2018-01-16 | End: 2018-01-16

## 2018-01-15 RX ORDER — MEPERIDINE HYDROCHLORIDE 25 MG/ML
12.5 INJECTION INTRAMUSCULAR; INTRAVENOUS; SUBCUTANEOUS PRN
Status: CANCELLED | OUTPATIENT
Start: 2018-01-16

## 2018-01-15 RX ORDER — ONDANSETRON 2 MG/ML
4 INJECTION INTRAMUSCULAR; INTRAVENOUS
Status: CANCELLED | OUTPATIENT
Start: 2018-01-16

## 2018-01-15 RX ORDER — ONDANSETRON 8 MG/1
8 TABLET, ORALLY DISINTEGRATING ORAL
Status: CANCELLED | OUTPATIENT
Start: 2018-01-16

## 2018-01-15 RX ORDER — PROCHLORPERAZINE MALEATE 10 MG
10 TABLET ORAL EVERY 6 HOURS PRN
Status: CANCELLED | OUTPATIENT
Start: 2018-01-16

## 2018-01-15 RX ORDER — DEXAMETHASONE SODIUM PHOSPHATE 4 MG/ML
20 INJECTION, SOLUTION INTRA-ARTICULAR; INTRALESIONAL; INTRAMUSCULAR; INTRAVENOUS; SOFT TISSUE ONCE
Status: CANCELLED | OUTPATIENT
Start: 2018-01-16 | End: 2018-01-16

## 2018-01-15 RX ORDER — ACYCLOVIR 400 MG/1
400 TABLET ORAL 2 TIMES DAILY
Qty: 30 TAB | Refills: 2 | Status: SHIPPED | OUTPATIENT
Start: 2018-01-15 | End: 2018-01-17 | Stop reason: SDUPTHER

## 2018-01-15 RX ORDER — SODIUM CHLORIDE 9 MG/ML
1000 INJECTION, SOLUTION INTRAVENOUS CONTINUOUS
Status: CANCELLED | OUTPATIENT
Start: 2018-01-16

## 2018-01-15 ASSESSMENT — ENCOUNTER SYMPTOMS
CHILLS: 0
SHORTNESS OF BREATH: 0
DIARRHEA: 0
CONSTIPATION: 0
COUGH: 0
HEADACHES: 0
DIZZINESS: 0
VOMITING: 0
WHEEZING: 0
INSOMNIA: 1
MYALGIAS: 0
NAUSEA: 0
WEIGHT LOSS: 0
FEVER: 0
TINGLING: 1
PALPITATIONS: 0

## 2018-01-15 ASSESSMENT — PAIN SCALES - GENERAL
PAINLEVEL: NO PAIN
PAINLEVEL: NO PAIN

## 2018-01-15 NOTE — PROGRESS NOTES
Amanuel Zamora is a 62 y.o. male here for a non-provider visit for: Lab Draws  on 1/15/2018 at 10:24 AM    Procedure Performed: Venipuncture     Anatomical site: Right Antecubital Area (AC)    Equipment used: 21g     Labs drawn: CBC, CMP, Uric Acid    Ordering Provider: Dr. LOUIS Rodrigues By: Nate Harp, Xavier Ass't    Successful lab draw, no complications.

## 2018-01-15 NOTE — PROGRESS NOTES
Subjective:      Amanuel Zamora is a 62 y.o. male who presents for Follow-Up (prechemo) for evaluation prior to cycle 5 Obinutuzumab for CLL with 17P deletion.    HPI   Mr. Zamora presents for evaluation prior to cycle 5 Obinutuzumab for CLL with 17P deletion. Chlorambucil was discontinued at cycle 4 due to very good response to treatment. He is accompanied today by his wife and son-in-law    Mr. Zamora reports that he is doing well and continues to tolerate treatment very well. He continues with itching at night that is relieved with lotion. Baseline peripheral neuropathy secondary to diabetes remains stable. He is otherwise asymptomatic.      No Known Allergies    Current Outpatient Prescriptions on File Prior to Visit   Medication Sig Dispense Refill   • metformin (GLUCOPHAGE) 1000 MG tablet Take 1 Tab by mouth 2 times a day, with meals. 180 Tab 3   • chlorambucil (LEUKERAN) 2 MG Tab Take 6 Tabs by mouth every 14 days. 12 Tab 3   • metformin ER (GLUCOPHAGE XR) 500 MG TABLET SR 24 HR Take 500 mg by mouth every day.     • ondansetron (ZOFRAN) 4 MG Tab tablet Take 1 Tab by mouth every four hours as needed for Nausea/Vomiting (for nausea, vomiting). 30 Tab 6   • prochlorperazine (COMPAZINE) 10 MG Tab Take 1 Tab by mouth every 6 hours as needed (for nausea, vomiting). 30 Tab 6   • aspirin EC (ECOTRIN) 81 MG Tablet Delayed Response Take 81 mg by mouth every day.     • Hyaluronate Sodium 0.2 % Solution by Apply externally route.     • glimepiride (AMARYL) 2 MG Tab Take 2 mg by mouth every morning.       No current facility-administered medications on file prior to visit.        Review of Systems   Constitutional: Positive for malaise/fatigue (Mild). Negative for chills, fever and weight loss (Stable).   Respiratory: Negative for cough, shortness of breath and wheezing.    Cardiovascular: Negative for chest pain, palpitations and leg swelling.   Gastrointestinal: Negative for constipation (Last BM this am - formed),  "diarrhea, nausea and vomiting.   Genitourinary: Negative for dysuria.   Musculoskeletal: Negative for myalgias.        Left side, sometimes - x 1 month   Skin: Positive for itching (noticed at night, no associated rash - better with lotion). Negative for rash.   Neurological: Positive for tingling (Baseline: fingers and toes from diabetes - stable). Negative for dizziness and headaches.   Psychiatric/Behavioral: The patient has insomnia (itching makes it hard to sleep but son states once he's asleep, he sleeps well).         Objective:     /80   Pulse 96   Temp 36.8 °C (98.2 °F)   Resp 16   Ht 1.548 m (5' 0.95\")   Wt 63.9 kg (140 lb 14 oz)   SpO2 95%   BMI 26.67 kg/m²      Physical Exam   Constitutional: He is oriented to person, place, and time. He appears well-developed and well-nourished. No distress.   HENT:   Head: Normocephalic and atraumatic.   Mouth/Throat: Oropharynx is clear and moist. No oropharyngeal exudate.   Eyes: Conjunctivae and EOM are normal. Pupils are equal, round, and reactive to light. Right eye exhibits no discharge. Left eye exhibits no discharge. No scleral icterus.   Neck: Normal range of motion. Neck supple. No thyromegaly present.   Cardiovascular: Normal rate, regular rhythm, normal heart sounds and intact distal pulses.  Exam reveals no gallop and no friction rub.    No murmur heard.  Pulmonary/Chest: Effort normal and breath sounds normal. No respiratory distress. He has no wheezes.   Abdominal: Soft. Bowel sounds are normal. He exhibits no distension and no mass. There is no hepatosplenomegaly. There is no tenderness. There is no rebound. No hernia.   No pain or tenderness with palpation today   Musculoskeletal: Normal range of motion. He exhibits no edema or tenderness.   Lymphadenopathy:        Head (right side): No submental, no submandibular, no tonsillar, no preauricular, no posterior auricular and no occipital adenopathy present.        Head (left side): No " submental, no submandibular, no tonsillar, no preauricular, no posterior auricular and no occipital adenopathy present.     He has no cervical adenopathy.        Right cervical: No superficial cervical and no deep cervical adenopathy present.       Left cervical: No superficial cervical and no deep cervical adenopathy present.        Right: No supraclavicular adenopathy present.        Left: No supraclavicular adenopathy present.   Neurological: He is alert and oriented to person, place, and time.   Skin: Skin is warm and dry. No rash noted. He is not diaphoretic. No erythema. No pallor.   Psychiatric: He has a normal mood and affect. His behavior is normal.   Vitals reviewed.      Hospital Outpatient Visit on 01/15/2018   Component Date Value Ref Range Status   • WBC 01/15/2018 10.0  4.8 - 10.8 K/uL Final   • RBC 01/15/2018 5.78  4.70 - 6.10 M/uL Final   • Hemoglobin 01/15/2018 16.5  14.0 - 18.0 g/dL Final   • Hematocrit 01/15/2018 49.0  42.0 - 52.0 % Final   • MCV 01/15/2018 84.8  81.4 - 97.8 fL Final   • MCH 01/15/2018 28.5  27.0 - 33.0 pg Final   • MCHC 01/15/2018 33.7  33.7 - 35.3 g/dL Final   • RDW 01/15/2018 43.8  35.9 - 50.0 fL Final   • Platelet Count 01/15/2018 175  164 - 446 K/uL Final   • MPV 01/15/2018 10.0  9.0 - 12.9 fL Final   • Neutrophils-Polys 01/15/2018 62.50  44.00 - 72.00 % Final   • Lymphocytes 01/15/2018 28.60  22.00 - 41.00 % Final   • Monocytes 01/15/2018 7.10  0.00 - 13.40 % Final   • Eosinophils 01/15/2018 1.10  0.00 - 6.90 % Final   • Basophils 01/15/2018 0.30  0.00 - 1.80 % Final   • Immature Granulocytes 01/15/2018 0.40  0.00 - 0.90 % Final   • Nucleated RBC 01/15/2018 0.00  /100 WBC Final   • Neutrophils (Absolute) 01/15/2018 6.24  1.82 - 7.42 K/uL Final   • Lymphs (Absolute) 01/15/2018 2.85  1.00 - 4.80 K/uL Final   • Monos (Absolute) 01/15/2018 0.71  0.00 - 0.85 K/uL Final   • Eos (Absolute) 01/15/2018 0.11  0.00 - 0.51 K/uL Final   • Baso (Absolute) 01/15/2018 0.03  0.00 - 0.12  K/uL Final   • Immature Granulocytes (abs) 01/15/2018 0.04  0.00 - 0.11 K/uL Final   • NRBC (Absolute) 01/15/2018 0.00  K/uL Final   • Sodium 01/15/2018 137  135 - 145 mmol/L Final   • Potassium 01/15/2018 3.7  3.6 - 5.5 mmol/L Final   • Chloride 01/15/2018 101  96 - 112 mmol/L Final   • Co2 01/15/2018 29  20 - 33 mmol/L Final   • Anion Gap 01/15/2018 7.0  0.0 - 11.9 Final   • Glucose 01/15/2018 195* 65 - 99 mg/dL Final   • Bun 01/15/2018 14  8 - 22 mg/dL Final   • Creatinine 01/15/2018 0.99  0.50 - 1.40 mg/dL Final   • Calcium 01/15/2018 9.9  8.5 - 10.5 mg/dL Final   • AST(SGOT) 01/15/2018 10* 12 - 45 U/L Final   • ALT(SGPT) 01/15/2018 10  2 - 50 U/L Final   • Alkaline Phosphatase 01/15/2018 47  30 - 99 U/L Final   • Total Bilirubin 01/15/2018 0.5  0.1 - 1.5 mg/dL Final   • Albumin 01/15/2018 4.7  3.2 - 4.9 g/dL Final   • Total Protein 01/15/2018 6.9  6.0 - 8.2 g/dL Final   • Globulin 01/15/2018 2.2  1.9 - 3.5 g/dL Final   • A-G Ratio 01/15/2018 2.1  g/dL Final   • Uric Acid 01/15/2018 5.3  2.5 - 8.3 mg/dL Final   • GFR If  01/15/2018 >60  >60 mL/min/1.73 m 2 Final   • GFR If Non  01/15/2018 >60  >60 mL/min/1.73 m 2 Final        Assessment/Plan:     1. CLL (chronic lymphocytic leukemia) (CMS-HCC)     2. Diabetes mellitus type 2 in nonobese (CMS-HCC)     3. Polyneuropathy associated with underlying disease (CMS-HCC)     4. Pain     5. Itching     6. Encounter for antineoplastic chemotherapy         1.  DM/peripheral neuropathy: Stable, random glucose is improved, continue to monitor    2.  Pain: Intermittent at left side. Negative hepatosplenomegaly and no tenderness noted today, continue to monitor.    3.  Itching: Noted nightly, self-limiting, no associated rash, symtoms improve with use of body lotion. He'll continue to monitor.    4.  CLL: He continues with Obinutuzumab and is tolerating therapy very well. Chlorabucil was able to be discontinued with cycle 4 due to improved  disease status. Empiric Acyclovir is refilled today.    CBC and CMP are been reviewed and found to be within acceptable limits. He'll proceed with cycle 5 of treatment and return in 4 weeks for evaluation prior to cycle 6, sooner as needed.        Please note that this dictation was created using voice recognition software. I have made every reasonable attempt to correct obvious errors, but I expect that there are errors of grammar and possibly content that I did not discover before finalizing the note.

## 2018-01-16 ENCOUNTER — OUTPATIENT INFUSION SERVICES (OUTPATIENT)
Dept: ONCOLOGY | Facility: MEDICAL CENTER | Age: 63
End: 2018-01-16
Attending: INTERNAL MEDICINE
Payer: COMMERCIAL

## 2018-01-16 VITALS
BODY MASS INDEX: 23.21 KG/M2 | OXYGEN SATURATION: 95 % | RESPIRATION RATE: 18 BRPM | DIASTOLIC BLOOD PRESSURE: 80 MMHG | WEIGHT: 139.33 LBS | SYSTOLIC BLOOD PRESSURE: 127 MMHG | TEMPERATURE: 98.5 F | HEART RATE: 87 BPM | HEIGHT: 65 IN

## 2018-01-16 DIAGNOSIS — C91.10 CLL (CHRONIC LYMPHOCYTIC LEUKEMIA) (HCC): ICD-10-CM

## 2018-01-16 PROCEDURE — 700105 HCHG RX REV CODE 258

## 2018-01-16 PROCEDURE — 700111 HCHG RX REV CODE 636 W/ 250 OVERRIDE (IP): Performed by: NURSE PRACTITIONER

## 2018-01-16 PROCEDURE — 96415 CHEMO IV INFUSION ADDL HR: CPT

## 2018-01-16 PROCEDURE — A9270 NON-COVERED ITEM OR SERVICE: HCPCS | Performed by: NURSE PRACTITIONER

## 2018-01-16 PROCEDURE — 96413 CHEMO IV INFUSION 1 HR: CPT

## 2018-01-16 PROCEDURE — 96375 TX/PRO/DX INJ NEW DRUG ADDON: CPT

## 2018-01-16 PROCEDURE — 700102 HCHG RX REV CODE 250 W/ 637 OVERRIDE(OP): Performed by: NURSE PRACTITIONER

## 2018-01-16 PROCEDURE — 700105 HCHG RX REV CODE 258: Performed by: NURSE PRACTITIONER

## 2018-01-16 RX ORDER — DIPHENHYDRAMINE HYDROCHLORIDE 50 MG/ML
25 INJECTION INTRAMUSCULAR; INTRAVENOUS ONCE
Status: COMPLETED | OUTPATIENT
Start: 2018-01-16 | End: 2018-01-16

## 2018-01-16 RX ORDER — ACETAMINOPHEN 500 MG
1000 TABLET ORAL ONCE
Status: COMPLETED | OUTPATIENT
Start: 2018-01-16 | End: 2018-01-16

## 2018-01-16 RX ORDER — SODIUM CHLORIDE 9 MG/ML
1000 INJECTION, SOLUTION INTRAVENOUS CONTINUOUS
Status: DISCONTINUED | OUTPATIENT
Start: 2018-01-16 | End: 2018-01-16 | Stop reason: HOSPADM

## 2018-01-16 RX ORDER — DEXAMETHASONE SODIUM PHOSPHATE 4 MG/ML
20 INJECTION, SOLUTION INTRA-ARTICULAR; INTRALESIONAL; INTRAMUSCULAR; INTRAVENOUS; SOFT TISSUE ONCE
Status: COMPLETED | OUTPATIENT
Start: 2018-01-16 | End: 2018-01-16

## 2018-01-16 RX ADMIN — SODIUM CHLORIDE 250 ML: 9 INJECTION, SOLUTION INTRAVENOUS at 11:52

## 2018-01-16 RX ADMIN — DIPHENHYDRAMINE HYDROCHLORIDE 25 MG: 50 INJECTION INTRAMUSCULAR; INTRAVENOUS at 11:54

## 2018-01-16 RX ADMIN — OBINUTUZUMAB 1000 MG: 1000 INJECTION, SOLUTION, CONCENTRATE INTRAVENOUS at 12:46

## 2018-01-16 RX ADMIN — DEXAMETHASONE SODIUM PHOSPHATE 20 MG: 4 INJECTION, SOLUTION INTRAMUSCULAR; INTRAVENOUS at 12:00

## 2018-01-16 RX ADMIN — ACETAMINOPHEN 1000 MG: 500 TABLET ORAL at 11:53

## 2018-01-16 ASSESSMENT — PAIN SCALES - GENERAL: PAINLEVEL: NO PAIN

## 2018-01-16 NOTE — PROGRESS NOTES
"Chemotherapy Verification - PRIMARY RN      Height = 64.96\"  Weight = 63.2 kg  BSA = 1.69 m2       Medication: Gazyva  Dose: SET DOSE  Calculated Dose: 1000 mg                              I confirm this process was performed independently with the BSA and all final chemotherapy dosing calculations congruent.  Any discrepancies of 5% or greater have been addressed with the chemotherapy pharmacist. The resolution of the discrepancy has been documented in the EPIC progress notes.       "

## 2018-01-16 NOTE — PROGRESS NOTES
Pt presents ambulatory with spouse for C5 Gazyva. Pt reports no active s/s of infection or adverse side effects from previous treatments. IV established and pre-medications given. 30 mins observed prior to starting treatment. Gazyva currently infusing at rate of 50 mL/hr, titrated in increments of 25 mL/hr every 30 mins. Pt resting in chair with call light within reach with family at chairside.

## 2018-01-16 NOTE — PROGRESS NOTES
"Pharmacy Chemotherapy Verification    Patient Name: Amanuel Zamora  Dx: CLL     Protocol: Obinutuzumab + chlorambucil    *Dosing Reference*  Chlorambucil:  0.5 mg/kg PO days 1 and 15  Obinutuzumab:   100 mg IV on day 1 cycle 1 followed by  900 mg IV on day 2 of cycle 1, followed by  1000 mg IV on days 8 and 15 of cycle 1, followed by  1000 mg IV on day 1 cycles 2-6  Q28 days for 6 cycles   NCCN Guidelines for CLL.V.1.2017  Beatriz MUNOZ et al. N Engl J Med. 2014 Mar 20;370(12):1101-10.      Allergies: Review of patient's allergies indicates no known allergies.  /80   Pulse 87   Temp 36.9 °C (98.5 °F)   Resp 18   Ht 1.65 m (5' 4.96\")   Wt 63.2 kg (139 lb 5.3 oz)   SpO2 95%   BMI 23.21 kg/m²  Body surface area is 1.7 meters squared.    Labs 1/15/18  ANC ~6240 Plt = 175k   Hgb = 16.5     SCr = 0.99 mg/dL CrCl ~68 mL/min   LFT's = 10/10/47 TBili = 0.5    Uric Acid = 5.3   K+ = 3.7      9/13/2017 09:57   Hepatitis B Surface Antigen Negative   Hepatitis B Cors Ab,IgM Negative         Drug Order   (Drug name, dose, route, IV Fluid & volume, frequency, number of doses) Cycle 5  Previous treatment: C4 = 12/19/17     Medication = Obinutuzumab (Gazyva)  Base Dose = 1000 mg fixed dose  Calc Dose: Base Dose: NO calc. needed  Final Dose = 1000 mg  Route = IV  Fluid & Volume =  mL  Admin Duration = Titrate as rosalie to a max rate of 400 mg/hr          <5% difference, OK to treat with final dose      By my signature below, I confirm this process was performed independently with the BSA and all final chemotherapy dosing calculations congruent. I have reviewed the above chemotherapy order and that my calculation of the final dose and BSA (when applicable) corroborate those calculations of the  pharmacist. Discrepancies of 5% or greater in the written dose have been addressed and documented within the [facility] Progress notes.    Fito Malone, PharmD  "

## 2018-01-16 NOTE — PROGRESS NOTES
Chemotherapy Verification - SECONDARY RN       Height = 64.96 in  Weight = 63.2 kg  BSA = 1.70m2       Medication: Gazyva  Dose: SET DOSE  Calculated Dose: 1000 mg                             (In mg/m2, AUC, mg/kg)     I confirm that this process was performed independently.

## 2018-01-16 NOTE — PROGRESS NOTES
"Pharmacy Chemotherapy Calculation:    Patient name: Amanuel Zamora  DX: CLL    Cycle 5 Previous treatment: 12/19/17    Protocol: Chlorambucil + Obinutuzumab   Chlorambucil 0.5 mg/kg PO on Days 1 and 15 - patient taking own   Obinutuzumab 100 mg IV on Day 1, followed by 900 mg IV on Day 2, followed by  1000 mg IV on Days 8 and 15 for cycle 1   Obinutuzumab 1000 mg IV on Day 1 for cycles 2-6  Every 28 days for 6 cycles   NCCN Guidelines for CLL.V.1.2017  Beatriz V, et al. N Engl J Med. 2014 Mar 20;370(12):1101-10.     Allergies:  Review of patient's allergies indicates no known allergies.    /80   Pulse 87   Temp 36.9 °C (98.5 °F)   Resp 18   Ht 1.65 m (5' 4.96\")   Wt 63.2 kg (139 lb 5.3 oz)   SpO2 95%   BMI 23.21 kg/m²  Body surface area is 1.7 meters squared.    Labs 01/15/18:  ANC~ 6200    Plt = 175k   Hgb = 16.5   SCr = 0.99mg/dL CrCl ~ 68mL/min   LFT's = WNL    TBili = 0.5    Uric acic = 5.3     9/13/2017 09:57   Hepatitis B Surface Antigen Negative   Hepatitis B Cors Ab,IgM Negative       Obinutuzumab (Garzyva) 1000 mg fixed dose   No calc required, okay to treat with final dose = 1000 mg IV       Payal Asencio, PharmD, BCOP            "

## 2018-01-17 DIAGNOSIS — C91.10 LEUKEMIA, LYMPHOCYTIC, CHRONIC (HCC): Primary | ICD-10-CM

## 2018-01-17 RX ORDER — ACYCLOVIR 400 MG/1
400 TABLET ORAL 2 TIMES DAILY
Qty: 30 TAB | Refills: 2 | Status: SHIPPED | OUTPATIENT
Start: 2018-01-17 | End: 2018-02-12 | Stop reason: SDUPTHER

## 2018-01-17 RX ORDER — ACYCLOVIR 400 MG/1
400 TABLET ORAL 2 TIMES DAILY
OUTPATIENT
Start: 2018-01-17

## 2018-01-17 NOTE — PROGRESS NOTES
Chemotherapy titrated to max rate of 100 mL/hr and infusion completed without complications or adverse reactions. IV flushed with saline and d/c'd by Ale ESCOBEDO. Pt scheduled for cycle 6 on 2/13/18, appt confirmed with pt. Pt left ambulatory with family in no distress at 1620

## 2018-02-01 NOTE — PROGRESS NOTES
Patient is currently enrolled in Gazyva replacement medication patient assistance program.  Patient credited for the following dates:  1/16/18     Perri Perlaitzel   Pharmacy Patient Advocate

## 2018-02-09 DIAGNOSIS — C91.10 CLL (CHRONIC LYMPHOCYTIC LEUKEMIA) (HCC): ICD-10-CM

## 2018-02-12 ENCOUNTER — OFFICE VISIT (OUTPATIENT)
Dept: HEMATOLOGY ONCOLOGY | Facility: MEDICAL CENTER | Age: 63
End: 2018-02-12

## 2018-02-12 ENCOUNTER — NON-PROVIDER VISIT (OUTPATIENT)
Dept: HEMATOLOGY ONCOLOGY | Facility: MEDICAL CENTER | Age: 63
End: 2018-02-12

## 2018-02-12 ENCOUNTER — HOSPITAL ENCOUNTER (OUTPATIENT)
Facility: MEDICAL CENTER | Age: 63
End: 2018-02-12
Attending: INTERNAL MEDICINE

## 2018-02-12 VITALS
SYSTOLIC BLOOD PRESSURE: 128 MMHG | HEART RATE: 97 BPM | DIASTOLIC BLOOD PRESSURE: 82 MMHG | BODY MASS INDEX: 23.56 KG/M2 | RESPIRATION RATE: 16 BRPM | OXYGEN SATURATION: 94 % | TEMPERATURE: 98.2 F | WEIGHT: 141.43 LBS | HEIGHT: 65 IN

## 2018-02-12 VITALS
HEIGHT: 65 IN | SYSTOLIC BLOOD PRESSURE: 128 MMHG | OXYGEN SATURATION: 94 % | RESPIRATION RATE: 16 BRPM | HEART RATE: 97 BPM | TEMPERATURE: 98.2 F | BODY MASS INDEX: 23.56 KG/M2 | DIASTOLIC BLOOD PRESSURE: 82 MMHG | WEIGHT: 141.43 LBS

## 2018-02-12 DIAGNOSIS — C91.10 CLL (CHRONIC LYMPHOCYTIC LEUKEMIA) (HCC): ICD-10-CM

## 2018-02-12 LAB
ALBUMIN SERPL BCP-MCNC: 4.2 G/DL (ref 3.2–4.9)
ALBUMIN/GLOB SERPL: 1.6 G/DL
ALP SERPL-CCNC: 48 U/L (ref 30–99)
ALT SERPL-CCNC: 12 U/L (ref 2–50)
ANION GAP SERPL CALC-SCNC: 12 MMOL/L (ref 0–11.9)
AST SERPL-CCNC: 12 U/L (ref 12–45)
BASOPHILS # BLD AUTO: 0.3 % (ref 0–1.8)
BASOPHILS # BLD: 0.03 K/UL (ref 0–0.12)
BILIRUB SERPL-MCNC: 0.6 MG/DL (ref 0.1–1.5)
BUN SERPL-MCNC: 11 MG/DL (ref 8–22)
CALCIUM SERPL-MCNC: 9.8 MG/DL (ref 8.5–10.5)
CHLORIDE SERPL-SCNC: 101 MMOL/L (ref 96–112)
CO2 SERPL-SCNC: 24 MMOL/L (ref 20–33)
CREAT SERPL-MCNC: 0.89 MG/DL (ref 0.5–1.4)
EOSINOPHIL # BLD AUTO: 0.16 K/UL (ref 0–0.51)
EOSINOPHIL NFR BLD: 1.8 % (ref 0–6.9)
ERYTHROCYTE [DISTWIDTH] IN BLOOD BY AUTOMATED COUNT: 47.3 FL (ref 35.9–50)
GLOBULIN SER CALC-MCNC: 2.7 G/DL (ref 1.9–3.5)
GLUCOSE SERPL-MCNC: 223 MG/DL (ref 65–99)
HCT VFR BLD AUTO: 47.3 % (ref 42–52)
HGB BLD-MCNC: 16.3 G/DL (ref 14–18)
IMM GRANULOCYTES # BLD AUTO: 0.06 K/UL (ref 0–0.11)
IMM GRANULOCYTES NFR BLD AUTO: 0.7 % (ref 0–0.9)
LYMPHOCYTES # BLD AUTO: 2.22 K/UL (ref 1–4.8)
LYMPHOCYTES NFR BLD: 25.3 % (ref 22–41)
MCH RBC QN AUTO: 29.4 PG (ref 27–33)
MCHC RBC AUTO-ENTMCNC: 34.5 G/DL (ref 33.7–35.3)
MCV RBC AUTO: 85.2 FL (ref 81.4–97.8)
MONOCYTES # BLD AUTO: 0.69 K/UL (ref 0–0.85)
MONOCYTES NFR BLD AUTO: 7.9 % (ref 0–13.4)
NEUTROPHILS # BLD AUTO: 5.62 K/UL (ref 1.82–7.42)
NEUTROPHILS NFR BLD: 64 % (ref 44–72)
NRBC # BLD AUTO: 0 K/UL
NRBC BLD-RTO: 0 /100 WBC
PLATELET # BLD AUTO: 172 K/UL (ref 164–446)
PMV BLD AUTO: 10.8 FL (ref 9–12.9)
POTASSIUM SERPL-SCNC: 3.8 MMOL/L (ref 3.6–5.5)
PROT SERPL-MCNC: 6.9 G/DL (ref 6–8.2)
RBC # BLD AUTO: 5.55 M/UL (ref 4.7–6.1)
SODIUM SERPL-SCNC: 137 MMOL/L (ref 135–145)
URATE SERPL-MCNC: 5.1 MG/DL (ref 2.5–8.3)
WBC # BLD AUTO: 8.8 K/UL (ref 4.8–10.8)

## 2018-02-12 PROCEDURE — 80053 COMPREHEN METABOLIC PANEL: CPT

## 2018-02-12 PROCEDURE — 36415 COLL VENOUS BLD VENIPUNCTURE: CPT | Performed by: INTERNAL MEDICINE

## 2018-02-12 PROCEDURE — 84550 ASSAY OF BLOOD/URIC ACID: CPT

## 2018-02-12 PROCEDURE — 99213 OFFICE O/P EST LOW 20 MIN: CPT | Performed by: INTERNAL MEDICINE

## 2018-02-12 PROCEDURE — 85025 COMPLETE CBC W/AUTO DIFF WBC: CPT

## 2018-02-12 RX ORDER — ONDANSETRON 8 MG/1
8 TABLET, ORALLY DISINTEGRATING ORAL
Status: CANCELLED | OUTPATIENT
Start: 2018-02-13

## 2018-02-12 RX ORDER — LIDOCAINE HYDROCHLORIDE 10 MG/ML
0.5 INJECTION, SOLUTION INFILTRATION; PERINEURAL SEE ADMIN INSTRUCTIONS
Status: CANCELLED | OUTPATIENT
Start: 2018-02-13

## 2018-02-12 RX ORDER — ACETAMINOPHEN 500 MG
1000 TABLET ORAL ONCE
Status: CANCELLED | OUTPATIENT
Start: 2018-02-13 | End: 2018-02-13

## 2018-02-12 RX ORDER — DIPHENHYDRAMINE HYDROCHLORIDE 50 MG/ML
25 INJECTION INTRAMUSCULAR; INTRAVENOUS ONCE
Status: CANCELLED | OUTPATIENT
Start: 2018-02-13 | End: 2018-02-13

## 2018-02-12 RX ORDER — SODIUM CHLORIDE 9 MG/ML
1000 INJECTION, SOLUTION INTRAVENOUS CONTINUOUS
Status: CANCELLED | OUTPATIENT
Start: 2018-02-13

## 2018-02-12 RX ORDER — ONDANSETRON 2 MG/ML
4 INJECTION INTRAMUSCULAR; INTRAVENOUS
Status: CANCELLED | OUTPATIENT
Start: 2018-02-13

## 2018-02-12 RX ORDER — ACYCLOVIR 400 MG/1
400 TABLET ORAL 2 TIMES DAILY
Qty: 30 TAB | Refills: 2 | Status: SHIPPED | OUTPATIENT
Start: 2018-02-12

## 2018-02-12 RX ORDER — PROCHLORPERAZINE MALEATE 10 MG
10 TABLET ORAL EVERY 6 HOURS PRN
Status: CANCELLED | OUTPATIENT
Start: 2018-02-13

## 2018-02-12 RX ORDER — MEPERIDINE HYDROCHLORIDE 25 MG/ML
12.5 INJECTION INTRAMUSCULAR; INTRAVENOUS; SUBCUTANEOUS PRN
Status: CANCELLED | OUTPATIENT
Start: 2018-02-13

## 2018-02-12 RX ORDER — DEXAMETHASONE SODIUM PHOSPHATE 4 MG/ML
20 INJECTION, SOLUTION INTRA-ARTICULAR; INTRALESIONAL; INTRAMUSCULAR; INTRAVENOUS; SOFT TISSUE ONCE
Status: CANCELLED | OUTPATIENT
Start: 2018-02-13 | End: 2018-02-13

## 2018-02-12 ASSESSMENT — PAIN SCALES - GENERAL
PAINLEVEL: NO PAIN
PAINLEVEL: NO PAIN

## 2018-02-12 NOTE — PROGRESS NOTES
Consult Note: Oncology    Date of consultation: 2/12/2018     Referring provider: The patient is here by the kind referral of No ref. provider found    Reason for consultation:   CC: Chronic lymphocytic leukemia     History of presenting illness:   February 28, 2013 WBC 7.8  March 4, 2016. WBC 60  March 16, 2017 WBC 42.4  May 22, 2017   July 21, 2017 patient was seen by oncologist Dr. Mendez Mariee at [facility] hematology clinic. He said that he has had elevated white cell count for a long time and has been in watch and wait pattern. He was recommended treatment based on final FISH results for B cell lymphoproliferative disorder.  July 31, 2017 . Absolute lymphocyte count 355  August 4, 2017. 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 CD5 positive, CD19 positive CD20 dim partial 23 positive and Kappa +, no expression of , CD10, CD20 8.  August 28, 2017 . Absolute lymphocyte count 401. Patient was seen in follow-up at [facility]  September 12, 2017. Patient started on high-dose dexamethasone  September 23, 2017. Patient received cycle 1 day 1 of chlorambucil and Obinutuzumab  September 29, 2017 cycle 1 day 8 of Obinutuzumab held for thrombocytopenia.  October 6, 2017. Plan for cycle 1 day 15 of chlorambucil and Obinutuzumab  October 24, 2017. Cycle 2 of chlorambucil and Obinutuzumab  November 20, 2017. Cycle 3 of Obinutuzumab  December 18, 2017. Cycle 4 of Obinutuzumab  January 15, 2018. Cycle 5 of Obinutuzumab  February 13, 2018. Cycle 6 of Obinutuzumab    All relevant medical records available in [facility] were reviewed and are summarized above.    Amanuel Zamora  is a 62 y.o. year old male who is visiting family in the [facility] was seen in clinic at [facility] for leukocytosis. Workup showed he has CLL with 17 P deletion. The patient was found to have a rapid doubling time and in combination with 17 P  deletion he was recommended to start therapy. However he does not have health insurance and is being out-of-pocket. He also has limited time in the [place] and was not able to start this treatment at [facility]. The patient was referred to for [facility] cancer centre for insurance reasons and is here to start treatment.    Interval History:  Initially seen in our clinic on September 12, 2017  Said Noah is a 62 y.o. male who presents for follow-up. He denies any acute complaints. Specifically denies any new pain, shortness of breath, headaches, joint aches nausea, vomiting, weight changes, loss of appetite, night sweats or change in bowel habits. Patient states he is happy to be done with treatment and is leaving the country in a couple weeks.        Past Medical History:    Past Medical History:   Diagnosis Date   • Diabetes mellitus ([facility])          Allergies:  Review of patient's allergies indicates not on file.    Medications:    Current Outpatient Prescriptions   Medication Sig Dispense Refill   • acyclovir (ZOVIRAX) 400 MG tablet Take 1 Tab by mouth 2 times a day. 30 Tab 2   • metformin (GLUCOPHAGE) 1000 MG tablet Take 1 Tab by mouth 2 times a day, with meals. 180 Tab 3   • chlorambucil (LEUKERAN) 2 MG Tab Take 6 Tabs by mouth every 14 days. 12 Tab 3   • metformin ER (GLUCOPHAGE XR) 500 MG TABLET SR 24 HR Take 500 mg by mouth every day.     • ondansetron (ZOFRAN) 4 MG Tab tablet Take 1 Tab by mouth every four hours as needed for Nausea/Vomiting (for nausea, vomiting). 30 Tab 6   • prochlorperazine (COMPAZINE) 10 MG Tab Take 1 Tab by mouth every 6 hours as needed (for nausea, vomiting). 30 Tab 6   • aspirin EC (ECOTRIN) 81 MG Tablet Delayed Response Take 81 mg by mouth every day.     • Hyaluronate Sodium 0.2 % Solution by Apply externally route.     • glimepiride (AMARYL) 2 MG Tab Take 2 mg by mouth every morning.       No current facility-administered medications for this visit.        Social History:    "  Social History     Social History   • Marital status:      Spouse name: N/A   • Number of children: N/A   • Years of education: N/A     Occupational History   • retired       Social History Main Topics   • Smoking status: Never Smoker   • Smokeless tobacco: Never Used   • Alcohol use No   • Drug use: No   • Sexual activity: Yes     Partners: Female     Other Topics Concern   • Not on file     Social History Narrative   • No narrative on file       Family History:     Family History   Problem Relation Age of Onset   • No Known Problems Mother    • No Known Problems Father        Review of Systems:  Constitutional: No fever, chills, weight loss, malaise/fatigue.      All other review of systems are negative except what was mentioned above in the HPI.      Physical Exam:  Vitals:   /82   Pulse 97   Temp 36.8 °C (98.2 °F)   Resp 16   Ht 1.65 m (5' 4.96\")   Wt 64.1 kg (141 lb 6.8 oz)   SpO2 94%   BMI 23.56 kg/m²     ECOG performance status is 0  General: Not in acute distress, emaciated with temporal wasting  HEENT: No pallor, icterus. Oropharynx clear.   Neck: Supple, no palpable masses.  Lymph nodes: No palpable cervical, supraclavicular, axillary or inguinal lymphadenopathy.    CVS: regular rate and rhythm, no rubs or gallops  RESP: Clear to auscultate bilaterally, no wheezing or crackles.   ABD: Soft,  non distended, positive bowel sounds, palpable splenomegaly  EXT: No edema or cyanosis  CNS: Alert and oriented x3, No focal deficits.  Skin- No rash      Labs:   Recent Labs      02/12/18   1012   WBC  8.8   RBC  5.55   HEMOGLOBIN  16.3   HEMATOCRIT  47.3   MCV  85.2   MCH  29.4   MCHC  34.5   RDW  47.3   PLATELETCT  172   MPV  10.8         Imaging:    Assessment and Plan:  -CLL  -Zamudio stage II with splenomegaly  -17p deletion/TP53 mutation  -IGHV Mutated  -This is a very unusual situation. The patient 1st presented stating he can stay in the [place] until October 10. However today " he told me that mistake has been made and he can stay until January 2018.He has requested for an extension of stay based on medical necessity.  -Patient is poor risk because of 17p deletion  -Ideally the patient needs to be started on first-line chemoimmunotherapy followed by maintenance therapy or started on Ibrutininb. However the patient's social situation is that he is paying out of pocket for everything and cannot afford expensive medical care.   -Acyclovir for viral prophylaxis.  -September 12, 2017. Patient started on high-dose dexamethasone for 1 week.  -September 23, 2017. Patient received cycle 1 day 1 of chlorambucil and Obinutuzumab  -September 29, 2017 cycle 1 day 8 of Obinutuzumab held for thrombocytopenia.  -Start cycle 6 of treatment with Obintuzumab tomorrow.  -Consider chlorambucil maintenance her future oncologist    He agreed and verbalized his agreement and understanding with the current plan.  I answered all questions and concerns he has at this time.      Please note that this dictation was created using voice recognition software. I have made every reasonable attempt to correct obvious errors, but I expect that there are errors of grammar and possibly content that I did not discover before finalizing the note.      SIGNATURES:  Shaila Garzon

## 2018-02-12 NOTE — PROGRESS NOTES
Amanuel Zamora is a 62 y.o. male here for a non-provider visit for: Lab Draws  on 2/12/2018 at 10:22 AM    Procedure Performed: Venipuncture     Anatomical site: Right Antecubital Area (AC)    Equipment used: 21g    Labs drawn: CBC w/diff, CMP and Uric acid    Ordering Provider: Dr. LOUIS Rodrigues By: Nona Anna, Med Ass't   No complications

## 2018-02-13 ENCOUNTER — OUTPATIENT INFUSION SERVICES (OUTPATIENT)
Dept: ONCOLOGY | Facility: MEDICAL CENTER | Age: 63
End: 2018-02-13
Attending: INTERNAL MEDICINE

## 2018-02-13 VITALS
RESPIRATION RATE: 18 BRPM | WEIGHT: 141.54 LBS | DIASTOLIC BLOOD PRESSURE: 88 MMHG | HEIGHT: 65 IN | HEART RATE: 96 BPM | SYSTOLIC BLOOD PRESSURE: 141 MMHG | OXYGEN SATURATION: 95 % | TEMPERATURE: 98.5 F | BODY MASS INDEX: 23.58 KG/M2

## 2018-02-13 DIAGNOSIS — C91.10 CLL (CHRONIC LYMPHOCYTIC LEUKEMIA) (HCC): ICD-10-CM

## 2018-02-13 PROCEDURE — 700111 HCHG RX REV CODE 636 W/ 250 OVERRIDE (IP): Performed by: INTERNAL MEDICINE

## 2018-02-13 PROCEDURE — 700105 HCHG RX REV CODE 258

## 2018-02-13 PROCEDURE — 96375 TX/PRO/DX INJ NEW DRUG ADDON: CPT

## 2018-02-13 PROCEDURE — 96413 CHEMO IV INFUSION 1 HR: CPT

## 2018-02-13 PROCEDURE — A9270 NON-COVERED ITEM OR SERVICE: HCPCS | Performed by: INTERNAL MEDICINE

## 2018-02-13 PROCEDURE — 96415 CHEMO IV INFUSION ADDL HR: CPT

## 2018-02-13 PROCEDURE — 700102 HCHG RX REV CODE 250 W/ 637 OVERRIDE(OP): Performed by: INTERNAL MEDICINE

## 2018-02-13 PROCEDURE — 700105 HCHG RX REV CODE 258: Performed by: INTERNAL MEDICINE

## 2018-02-13 RX ORDER — DIPHENHYDRAMINE HYDROCHLORIDE 50 MG/ML
25 INJECTION INTRAMUSCULAR; INTRAVENOUS ONCE
Status: COMPLETED | OUTPATIENT
Start: 2018-02-13 | End: 2018-02-13

## 2018-02-13 RX ORDER — ACETAMINOPHEN 500 MG
1000 TABLET ORAL ONCE
Status: COMPLETED | OUTPATIENT
Start: 2018-02-13 | End: 2018-02-13

## 2018-02-13 RX ORDER — DEXAMETHASONE SODIUM PHOSPHATE 4 MG/ML
20 INJECTION, SOLUTION INTRA-ARTICULAR; INTRALESIONAL; INTRAMUSCULAR; INTRAVENOUS; SOFT TISSUE ONCE
Status: COMPLETED | OUTPATIENT
Start: 2018-02-13 | End: 2018-02-13

## 2018-02-13 RX ADMIN — OBINUTUZUMAB 1000 MG: 1000 INJECTION, SOLUTION, CONCENTRATE INTRAVENOUS at 11:32

## 2018-02-13 RX ADMIN — DEXAMETHASONE SODIUM PHOSPHATE 20 MG: 4 INJECTION, SOLUTION INTRA-ARTICULAR; INTRALESIONAL; INTRAMUSCULAR; INTRAVENOUS; SOFT TISSUE at 10:48

## 2018-02-13 RX ADMIN — DIPHENHYDRAMINE HYDROCHLORIDE 25 MG: 50 INJECTION INTRAMUSCULAR; INTRAVENOUS at 10:56

## 2018-02-13 RX ADMIN — ACETAMINOPHEN 1000 MG: 500 TABLET ORAL at 10:48

## 2018-02-13 ASSESSMENT — PAIN SCALES - GENERAL: PAINLEVEL: NO PAIN

## 2018-02-13 NOTE — PROGRESS NOTES
Chemotherapy Verification - PRIMARY RN      Height = 165cm  Weight = 64.2kg  BSA = 1.72m2       Medication: Gazyva  Dose: set dose  Calculated Dose: 1000mg        (cycle 6 dosing.)      I confirm this process was performed independently with the BSA and all final chemotherapy dosing calculations congruent.  Any discrepancies of 5% or greater have been addressed with the chemotherapy pharmacist. The resolution of the discrepancy has been documented in the EPIC progress notes.

## 2018-02-13 NOTE — PROGRESS NOTES
"Pharmacy Chemotherapy Verification    Patient Name: Amanuel Zamora  Dx: CLL     Protocol: Obinutuzumab + chlorambucil    *Dosing Reference*  Chlorambucil:  0.5 mg/kg PO days 1 and 15  Obinutuzumab:   100 mg IV on day 1 cycle 1 followed by  900 mg IV on day 2 of cycle 1, followed by  1000 mg IV on days 8 and 15 of cycle 1, followed by  1000 mg IV on day 1 cycles 2-6  Q28 days for 6 cycles   NCCN Guidelines for CLL.V.1.2017  Beatriz MUNOZ et al. N Engl J Med. 2014 Mar 20;370(12):1101-10.      Allergies: Review of patient's allergies indicates no known allergies.  /88   Pulse 96   Temp 36.9 °C (98.5 °F)   Resp 18   Ht 1.65 m (5' 4.96\")   Wt 64.2 kg (141 lb 8.6 oz)   SpO2 95%   BMI 23.58 kg/m²  Body surface area is 1.72 meters squared.    Labs 2/12/18:  ANC~ 5620 Plt = 172k   Hgb = 16.3     SCr = 0.89 mg/dL CrCl ~ 77 mL/min   LFT's = WNL TBili = 0.6   Uric Acid = 5.1 K+ = 3.8      9/13/2017 09:57   Hepatitis B Surface Antigen Negative   Hepatitis B Cors Ab,IgM Negative       Drug Order   (Drug name, dose, route, IV Fluid & volume, frequency, number of doses) Cycle 6  Previous treatment: C5 = 1/16/18     Medication = Obinutuzumab (Gazyva)  Base Dose = 1000 mg fixed dose  Calc Dose: Base Dose: NO calc. needed  Final Dose = 1000 mg  Route = IV  Fluid & Volume =  mL  Admin Duration = Titrate as rosalie to a max rate of 400 mg/hr          <5% difference, OK to treat with final dose      By my signature below, I confirm this process was performed independently with the BSA and all final chemotherapy dosing calculations congruent. I have reviewed the above chemotherapy order and that my calculation of the final dose and BSA (when applicable) corroborate those calculations of the  pharmacist. Discrepancies of 5% or greater in the written dose have been addressed and documented within the [facility] Progress notes.    Sarahi Hall, PharmD, BCOP  "

## 2018-02-13 NOTE — PROGRESS NOTES
"Pharmacy Chemotherapy Calculation:    Patient name: Amanuel Zamora  DX: CLL    Cycle 6   Previous treatment: C5 on 1/16/18    Protocol: Chlorambucil + Obinutuzumab   Chlorambucil 0.5 mg/kg PO on Days 1 and 15 - patient taking own    (completed 10/24/17)  Obinutuzumab 1000 mg IV on Day 1 for cycles 2-6  Every 28 days for 6 cycles   NCCN Guidelines for CLL.V.1.2017  Beatriz V, et al. N Engl J Med. 2014 Mar 20;370(12):1101-10.     Allergies:  Review of patient's allergies indicates no known allergies.    /88   Pulse 96   Temp 36.9 °C (98.5 °F)   Resp 18   Ht 1.65 m (5' 4.96\")   Wt 64.2 kg (141 lb 8.6 oz)   SpO2 95%   BMI 23.58 kg/m²  Body surface area is 1.72 meters squared.    Labs 2/12/18:  ANC~ 5620   Plt = 172 k     Hgb = 16.3     SCr = 0.89 mg/dL  CrCl ~ 77 mL/min    AST/ALT/AP = 12/12/48 TBili = 0.6   Uric acic = 5.1     9/13/2017 09:57   Hepatitis B Surface Antigen Negative   Hepatitis B Cors Ab,IgM Negative       Obinutuzumab (Gazyva) 1000 mg fixed dose   No calc required, okay to treat with final dose = 1000 mg IV         Flora Sommer, PharmD            "

## 2018-02-13 NOTE — PROGRESS NOTES
Chemotherapy Verification - SECONDARY RN       Height = 165 cm  Weight = 64.2 kg  BSA = 1.715 m2       Medication: Gazyva  Dose: set dose  Calculated Dose: 1000 mg                             (In mg/m2, AUC, mg/kg)     I confirm that this process was performed independently.

## 2018-02-14 NOTE — PROGRESS NOTES
Pt arrives ambulatory to [facility] accompanied by spouse.  He is here for cycle 6 Gazyva.  Lab results from 2/12 reviewed, pt states understanding his blood sugar is elevated, plan is to make diet changes.  Premeds given as ordered followed by Gazyva.  Pt tolerated well, no evidence of adverse effects noted or expressed.  PIV dc'd, pressure drsg to site.    Pt has no future appts at this time, he is aware of this and will stay in touch with Duran SABILLON for future plan of care.  dc'd to care of family in no apparent distress.

## 2018-02-23 NOTE — PROGRESS NOTES
Patient is currently enrolled in Gazyva replacement medication patient assistance program.  Patient credited for the following dates:  2/13/18       Perri Perlaitzel   Pharmacy Patient Advocate

## 2018-04-13 ENCOUNTER — TELEPHONE (OUTPATIENT)
Dept: HEMATOLOGY ONCOLOGY | Facility: MEDICAL CENTER | Age: 63
End: 2018-04-13

## 2018-11-14 NOTE — PROGRESS NOTES
Amanuel Zamora is a 62 y.o. male here for a non-provider visit for: Lab Draws  on 11/20/2017 at 9:23 AM    Procedure Performed: Venipuncture     Anatomical site: Left Antecubital Area (AC)    Equipment used: 21g    Labs drawn: CBC w/diff, CMP and Uric acid    Ordering Provider: Dr. LOUIS Rodrigues By: Nona Anna, Med Ass't   No complications       no

## 2021-03-03 DIAGNOSIS — Z23 NEED FOR VACCINATION: ICD-10-CM

## 2024-08-30 ENCOUNTER — DOCUMENTATION (OUTPATIENT)
Dept: HEALTH INFORMATION MANAGEMENT | Facility: OTHER | Age: 69
End: 2024-08-30